# Patient Record
Sex: MALE | Race: WHITE | NOT HISPANIC OR LATINO | Employment: OTHER | URBAN - METROPOLITAN AREA
[De-identification: names, ages, dates, MRNs, and addresses within clinical notes are randomized per-mention and may not be internally consistent; named-entity substitution may affect disease eponyms.]

---

## 2024-01-10 RX ORDER — ALBUTEROL SULFATE 90 UG/1
AEROSOL, METERED RESPIRATORY (INHALATION)
Refills: 2 | OUTPATIENT
Start: 2024-01-10

## 2024-02-12 DIAGNOSIS — J45.909 ASTHMA, UNSPECIFIED ASTHMA SEVERITY, UNSPECIFIED WHETHER COMPLICATED, UNSPECIFIED WHETHER PERSISTENT (HHS-HCC): Primary | ICD-10-CM

## 2024-02-12 RX ORDER — ALBUTEROL SULFATE 90 UG/1
AEROSOL, METERED RESPIRATORY (INHALATION)
Qty: 18 G | Refills: 2 | OUTPATIENT
Start: 2024-02-12

## 2024-02-14 RX ORDER — ALBUTEROL SULFATE 90 UG/1
AEROSOL, METERED RESPIRATORY (INHALATION)
COMMUNITY
Start: 2023-12-06 | End: 2024-02-14 | Stop reason: SDUPTHER

## 2024-02-14 RX ORDER — ALBUTEROL SULFATE 90 UG/1
AEROSOL, METERED RESPIRATORY (INHALATION)
Qty: 18 G | Refills: 3 | Status: SHIPPED | OUTPATIENT
Start: 2024-02-14 | End: 2024-04-08 | Stop reason: SDUPTHER

## 2024-04-08 ENCOUNTER — OFFICE VISIT (OUTPATIENT)
Dept: PRIMARY CARE | Facility: CLINIC | Age: 64
End: 2024-04-08
Payer: COMMERCIAL

## 2024-04-08 VITALS
SYSTOLIC BLOOD PRESSURE: 124 MMHG | OXYGEN SATURATION: 97 % | BODY MASS INDEX: 24.62 KG/M2 | HEIGHT: 70 IN | HEART RATE: 76 BPM | DIASTOLIC BLOOD PRESSURE: 84 MMHG | TEMPERATURE: 98.4 F | WEIGHT: 172 LBS

## 2024-04-08 DIAGNOSIS — Z72.0 TOBACCO USE: ICD-10-CM

## 2024-04-08 DIAGNOSIS — Z23 NEED FOR DIPHTHERIA-TETANUS-PERTUSSIS (TDAP) VACCINE: ICD-10-CM

## 2024-04-08 DIAGNOSIS — Z00.00 ANNUAL PHYSICAL EXAM: Primary | ICD-10-CM

## 2024-04-08 DIAGNOSIS — Z12.5 PROSTATE CANCER SCREENING: ICD-10-CM

## 2024-04-08 DIAGNOSIS — J45.909 ASTHMA, UNSPECIFIED ASTHMA SEVERITY, UNSPECIFIED WHETHER COMPLICATED, UNSPECIFIED WHETHER PERSISTENT (HHS-HCC): ICD-10-CM

## 2024-04-08 DIAGNOSIS — Z12.11 COLON CANCER SCREENING: ICD-10-CM

## 2024-04-08 DIAGNOSIS — H61.23 BILATERAL IMPACTED CERUMEN: ICD-10-CM

## 2024-04-08 PROCEDURE — 99396 PREV VISIT EST AGE 40-64: CPT | Performed by: FAMILY MEDICINE

## 2024-04-08 PROCEDURE — 90715 TDAP VACCINE 7 YRS/> IM: CPT

## 2024-04-08 PROCEDURE — 90471 IMMUNIZATION ADMIN: CPT

## 2024-04-08 RX ORDER — MELOXICAM 7.5 MG/1
7.5 TABLET ORAL DAILY
COMMUNITY
Start: 2024-04-07

## 2024-04-08 RX ORDER — TRAMADOL HYDROCHLORIDE 50 MG/1
50 TABLET ORAL EVERY 6 HOURS PRN
COMMUNITY
Start: 2024-02-12

## 2024-04-08 RX ORDER — ALBUTEROL SULFATE 90 UG/1
AEROSOL, METERED RESPIRATORY (INHALATION)
Qty: 18 G | Refills: 3 | Status: SHIPPED | OUTPATIENT
Start: 2024-04-08

## 2024-04-08 ASSESSMENT — ENCOUNTER SYMPTOMS
VOMITING: 0
WEAKNESS: 0
HEMATURIA: 0
NAUSEA: 0
CONSTIPATION: 0
FREQUENCY: 0
DIZZINESS: 0
JOINT SWELLING: 1
ARTHRALGIAS: 1
BLOOD IN STOOL: 0
DIARRHEA: 0
SHORTNESS OF BREATH: 1
HEADACHES: 0
LIGHT-HEADEDNESS: 0
DIFFICULTY URINATING: 0

## 2024-04-08 ASSESSMENT — PATIENT HEALTH QUESTIONNAIRE - PHQ9
1. LITTLE INTEREST OR PLEASURE IN DOING THINGS: NOT AT ALL
2. FEELING DOWN, DEPRESSED OR HOPELESS: NOT AT ALL
SUM OF ALL RESPONSES TO PHQ9 QUESTIONS 1 AND 2: 0

## 2024-04-08 NOTE — PROGRESS NOTES
Chief Complaint   Flavio Bahena is a 63 y.o. male who presents for annual physical exam.     Pt states that he has no new complaints or concerns today. Continues to follow with specialist for his hands. Has not had any recent surgeries, hospitalizations, or ER visits. ROS reviewed as below.    ROS:  Review of Systems   HENT:  Positive for hearing loss. Negative for nosebleeds.    Respiratory:  Positive for shortness of breath.    Cardiovascular:  Negative for chest pain and leg swelling.   Gastrointestinal:  Negative for blood in stool, constipation, diarrhea, nausea and vomiting.   Genitourinary:  Negative for difficulty urinating, frequency and hematuria.   Musculoskeletal:  Positive for arthralgias and joint swelling.   Skin:  Negative for rash.   Neurological:  Negative for dizziness, syncope, weakness, light-headedness and headaches.         PMH:  No past medical history on file.      PSH/Trauma:  No past surgical history on file.      Meds:    Current Outpatient Medications:     meloxicam (Mobic) 7.5 mg tablet, Take 1 tablet (7.5 mg) by mouth once daily., Disp: , Rfl:     traMADol (Ultram) 50 mg tablet, Take 1 tablet (50 mg) by mouth every 6 hours if needed., Disp: , Rfl:     albuterol 90 mcg/actuation inhaler, INHALE 1 TO 2 PUFFS EVERY 4 TO 6 HOURS., Disp: 18 g, Rfl: 3    carbamide peroxide (Debrox) 6.5 % otic solution, Administer 5 drops into each ear 2 times a day for 4 days., Disp: 15 mL, Rfl: 0    Allergies:  No Known Allergies    SH:  Social Determinants of Health     Tobacco Use: Medium Risk (4/8/2024)    Patient History     Smoking Tobacco Use: Former     Smokeless Tobacco Use: Never     Passive Exposure: Not on file   Alcohol Use: Not on file   Financial Resource Strain: Not on file   Food Insecurity: Not on file   Transportation Needs: Not on file   Physical Activity: Not on file   Stress: Not on file   Social Connections: Not on file   Intimate Partner Violence: Not on file   Depression: Not at  "risk (4/8/2024)    PHQ-2     PHQ-2 Score: 0   Housing Stability: Not on file   Utilities: Not on file   Digital Equity: Not on file   3 kids and     FH:  No family history on file.     Preventatives:  Colonoscopy- ordered  AAA screening-  ordered  Lung CA screening- ordered  Health Maintenance   Topic Date Due    Yearly Adult Physical  Never done    HIV Screening  Never done    Colorectal Cancer Screening  Never done    MMR Vaccines (1 of 1 - Standard series) Never done    Hepatitis C Screening  Never done    Zoster Vaccines (1 of 2) Never done    COVID-19 Vaccine (4 - 2023-24 season) 09/01/2023    Influenza Vaccine (Season Ended) 09/01/2024    Lipid Panel  02/28/2025    DTaP/Tdap/Td Vaccines (2 - Td or Tdap) 04/08/2034    HIB Vaccines  Aged Out    Hepatitis B Vaccines  Aged Out    IPV Vaccines  Aged Out    Hepatitis A Vaccines  Aged Out    Meningococcal Vaccine  Aged Out    Rotavirus Vaccines  Aged Out    HPV Vaccines  Aged Out    Pneumococcal Vaccine: Pediatrics (0 to 5 Years) and At-Risk Patients (6 to 64 Years)  Aged Out     ASCVD risk score The ASCVD Risk score (Lucinda CASILLAS, et al., 2019) failed to calculate for the following reasons:    Cannot find a previous HDL lab    Cannot find a previous total cholesterol lab    Unable to determine if patient is Non-             PE:  Visit Vitals  /84   Pulse 76   Temp 36.9 °C (98.4 °F)   Ht 1.778 m (5' 10\")   Wt 78 kg (172 lb)   SpO2 97%   BMI 24.68 kg/m²   Smoking Status Former   BSA 1.96 m²     Physical Exam  Constitutional:       Appearance: Normal appearance. He is normal weight.   HENT:      Head: Normocephalic and atraumatic.      Right Ear: Decreased hearing noted. There is impacted cerumen.      Left Ear: Decreased hearing noted. There is impacted cerumen.      Nose: Nose normal.      Mouth/Throat:      Mouth: Mucous membranes are moist.      Pharynx: Oropharynx is clear.   Eyes:      Extraocular Movements: Extraocular movements " intact.      Conjunctiva/sclera: Conjunctivae normal.      Pupils: Pupils are equal, round, and reactive to light.   Cardiovascular:      Rate and Rhythm: Normal rate and regular rhythm.      Heart sounds: Normal heart sounds.   Pulmonary:      Effort: Pulmonary effort is normal.      Breath sounds: Normal breath sounds.   Abdominal:      General: Abdomen is flat.      Palpations: Abdomen is soft.   Musculoskeletal:         General: Normal range of motion.   Skin:     General: Skin is warm and dry.      Capillary Refill: Capillary refill takes less than 2 seconds.   Neurological:      General: No focal deficit present.      Mental Status: He is alert and oriented to person, place, and time. Mental status is at baseline.   Psychiatric:         Mood and Affect: Mood normal.         Behavior: Behavior normal.         Thought Content: Thought content normal.         Judgment: Judgment normal.         Assessment/Plan  Flavio was seen today for annual exam.  Diagnoses and all orders for this visit:  Annual physical exam (Primary)  -     CBC; Future  -     Comprehensive Metabolic Panel; Future  -     Lipid Panel; Future  -     TSH with reflex to Free T4 if abnormal; Future  Asthma, unspecified asthma severity, unspecified whether complicated, unspecified whether persistent  -     albuterol 90 mcg/actuation inhaler; INHALE 1 TO 2 PUFFS EVERY 4 TO 6 HOURS.  Colon cancer screening  -     Cancel: Cologuard® colon cancer screening; Future  -     Cancel: Cologuard® colon cancer screening  -     Colonoscopy Screening; Average Risk Patient; Future  Tobacco use  -     CT lung screening low dose; Future  -     Vascular US Abdominal Aorta Aneurysm AAA Screening; Future  Prostate cancer screening  -     Prostate Specific Antigen, Screen; Future  Need for diphtheria-tetanus-pertussis (Tdap) vaccine  -     Tdap vaccine, age 7 years and older (ADACEL)  Bilateral impacted cerumen  -     carbamide peroxide (Debrox) 6.5 % otic solution;  Administer 5 drops into each ear 2 times a day for 4 days.       Pt is a pleasant 62 yo M who presenting to the office for an annual physical. Patient was noted to have bilateral cerumen impaction. Patient was prescribed some Debrox to use for the next 4 days then comeback in 2 weeks for his cerumen impaction. Patient was also given a Tdap vaccine at this visit. Due to patients history of tobacco use a CT low dose lung was ordered along with a AAA screening. Medication refills was sent to the patient preferred pharmacy. Yearly blood work was also ordered for the patient to complete.    Follow up in 2 weeks for ear cleaning and follow up with results.      Patient was staffed with Dr. Paul Pat DO, PGY-2  [facility]

## 2024-04-09 NOTE — PROGRESS NOTES
I reviewed and examined the patient. I was present for the key exam elements, and I fully participated in the patient's care. I discussed the management of the care with the resident. I have personally reviewed the pertinent labs and imaging, as well as recent notes, with the patient. I have reviewed the note above and agree with the resident's medical decision making as documented in the resident's note, in addition to the following comments / findings:     Agree with the rest of the plan outlined below by resident physician. No red flags.      The patient understands and agrees to the assessment and plan of care. Patient has also agreed to follow up and comply with the treatment and evaluation as recommended today. Patient was instructed to call the office at [phone] should questions arise regarding their treatment or care.     Vj Miller DO, FAOASM  Family Medicine   [address], [address]  [place] [address]     Vj Miller DO

## 2024-04-10 ENCOUNTER — LAB (OUTPATIENT)
Dept: LAB | Facility: LAB | Age: 64
End: 2024-04-10
Payer: COMMERCIAL

## 2024-04-10 DIAGNOSIS — Z12.5 PROSTATE CANCER SCREENING: ICD-10-CM

## 2024-04-10 DIAGNOSIS — Z00.00 ANNUAL PHYSICAL EXAM: ICD-10-CM

## 2024-04-10 LAB
ALBUMIN SERPL BCP-MCNC: 4.3 G/DL (ref 3.4–5)
ALP SERPL-CCNC: 64 U/L (ref 33–136)
ALT SERPL W P-5'-P-CCNC: 26 U/L (ref 10–52)
ANION GAP SERPL CALC-SCNC: 14 MMOL/L (ref 10–20)
AST SERPL W P-5'-P-CCNC: 20 U/L (ref 9–39)
BILIRUB SERPL-MCNC: 0.5 MG/DL (ref 0–1.2)
BUN SERPL-MCNC: 16 MG/DL (ref 6–23)
CALCIUM SERPL-MCNC: 9 MG/DL (ref 8.6–10.3)
CHLORIDE SERPL-SCNC: 102 MMOL/L (ref 98–107)
CHOLEST SERPL-MCNC: 170 MG/DL (ref 0–199)
CHOLESTEROL/HDL RATIO: 3.5
CO2 SERPL-SCNC: 27 MMOL/L (ref 21–32)
CREAT SERPL-MCNC: 1.03 MG/DL (ref 0.5–1.3)
EGFRCR SERPLBLD CKD-EPI 2021: 83 ML/MIN/1.73M*2
ERYTHROCYTE [DISTWIDTH] IN BLOOD BY AUTOMATED COUNT: 13.5 % (ref 11.5–14.5)
GLUCOSE SERPL-MCNC: 94 MG/DL (ref 74–99)
HCT VFR BLD AUTO: 52.2 % (ref 41–52)
HDLC SERPL-MCNC: 48.9 MG/DL
HGB BLD-MCNC: 17.6 G/DL (ref 13.5–17.5)
LDLC SERPL CALC-MCNC: 106 MG/DL
MCH RBC QN AUTO: 31.3 PG (ref 26–34)
MCHC RBC AUTO-ENTMCNC: 33.7 G/DL (ref 32–36)
MCV RBC AUTO: 93 FL (ref 80–100)
NON HDL CHOLESTEROL: 121 MG/DL (ref 0–149)
NRBC BLD-RTO: 0 /100 WBCS (ref 0–0)
PLATELET # BLD AUTO: 323 X10*3/UL (ref 150–450)
POTASSIUM SERPL-SCNC: 4.7 MMOL/L (ref 3.5–5.3)
PROT SERPL-MCNC: 6.4 G/DL (ref 6.4–8.2)
PSA SERPL-MCNC: 3.74 NG/ML
RBC # BLD AUTO: 5.62 X10*6/UL (ref 4.5–5.9)
SODIUM SERPL-SCNC: 138 MMOL/L (ref 136–145)
TRIGL SERPL-MCNC: 76 MG/DL (ref 0–149)
TSH SERPL-ACNC: 1.71 MIU/L (ref 0.44–3.98)
VLDL: 15 MG/DL (ref 0–40)
WBC # BLD AUTO: 7.8 X10*3/UL (ref 4.4–11.3)

## 2024-04-10 PROCEDURE — 85027 COMPLETE CBC AUTOMATED: CPT

## 2024-04-10 PROCEDURE — 36415 COLL VENOUS BLD VENIPUNCTURE: CPT

## 2024-04-10 PROCEDURE — 80061 LIPID PANEL: CPT

## 2024-04-10 PROCEDURE — 84443 ASSAY THYROID STIM HORMONE: CPT

## 2024-04-10 PROCEDURE — 80053 COMPREHEN METABOLIC PANEL: CPT

## 2024-04-10 PROCEDURE — 84153 ASSAY OF PSA TOTAL: CPT

## 2024-04-11 ENCOUNTER — APPOINTMENT (OUTPATIENT)
Dept: VASCULAR MEDICINE | Facility: HOSPITAL | Age: 64
End: 2024-04-11
Payer: COMMERCIAL

## 2024-04-30 ENCOUNTER — HOSPITAL ENCOUNTER (OUTPATIENT)
Dept: RADIOLOGY | Facility: HOSPITAL | Age: 64
Discharge: HOME | End: 2024-04-30
Payer: COMMERCIAL

## 2024-04-30 ENCOUNTER — OFFICE VISIT (OUTPATIENT)
Dept: PRIMARY CARE | Facility: CLINIC | Age: 64
End: 2024-04-30
Payer: COMMERCIAL

## 2024-04-30 VITALS
SYSTOLIC BLOOD PRESSURE: 124 MMHG | TEMPERATURE: 97.1 F | DIASTOLIC BLOOD PRESSURE: 70 MMHG | OXYGEN SATURATION: 98 % | HEIGHT: 71 IN | BODY MASS INDEX: 24.36 KG/M2 | WEIGHT: 174 LBS | HEART RATE: 78 BPM

## 2024-04-30 DIAGNOSIS — Z72.0 TOBACCO USE: ICD-10-CM

## 2024-04-30 DIAGNOSIS — H61.21 IMPACTED CERUMEN OF RIGHT EAR: Primary | ICD-10-CM

## 2024-04-30 DIAGNOSIS — E78.49 OTHER HYPERLIPIDEMIA: ICD-10-CM

## 2024-04-30 PROBLEM — J01.90 ACUTE SINUSITIS: Status: ACTIVE | Noted: 2024-04-30

## 2024-04-30 PROBLEM — G89.29 CHRONIC PAIN OF RIGHT KNEE: Status: ACTIVE | Noted: 2020-02-17

## 2024-04-30 PROBLEM — J45.909 ASTHMA (HHS-HCC): Status: ACTIVE | Noted: 2024-04-30

## 2024-04-30 PROBLEM — H61.23 BILATERAL IMPACTED CERUMEN: Status: ACTIVE | Noted: 2024-04-30

## 2024-04-30 PROBLEM — J32.9 SINUSITIS: Status: ACTIVE | Noted: 2024-04-30

## 2024-04-30 PROBLEM — M25.561 CHRONIC PAIN OF RIGHT KNEE: Status: ACTIVE | Noted: 2020-02-17

## 2024-04-30 PROCEDURE — 99214 OFFICE O/P EST MOD 30 MIN: CPT | Performed by: FAMILY MEDICINE

## 2024-04-30 PROCEDURE — 71271 CT THORAX LUNG CANCER SCR C-: CPT

## 2024-04-30 PROCEDURE — 1036F TOBACCO NON-USER: CPT | Performed by: FAMILY MEDICINE

## 2024-04-30 RX ORDER — ROSUVASTATIN CALCIUM 10 MG/1
10 TABLET, COATED ORAL DAILY
Qty: 100 TABLET | Refills: 1 | Status: SHIPPED | OUTPATIENT
Start: 2024-04-30 | End: 2024-11-16

## 2024-04-30 ASSESSMENT — PAIN SCALES - GENERAL: PAINLEVEL: 0-NO PAIN

## 2024-04-30 NOTE — PROGRESS NOTES
I reviewed and examined the patient. I was present for the key exam elements, and I fully participated in the patient's care. I discussed the management of the care with the resident. I have personally reviewed the pertinent labs and imaging, as well as recent notes, with the patient. I have reviewed the note above and agree with the resident's medical decision making as documented in the resident's note, in addition to the following comments / findings:     Agree with the rest of the plan outlined below by resident physician. No red flags.      The patient understands and agrees to the assessment and plan of care. Patient has also agreed to follow up and comply with the treatment and evaluation as recommended today. Patient was instructed to call the office at [phone] should questions arise regarding their treatment or care.     Vj Miller DO, FAOASM  Family Medicine   [address], [address]  [place] [address]     Vj Miller DO

## 2024-04-30 NOTE — PROGRESS NOTES
"Subjective   Patient ID: Flavio Bahena is a 63 y.o. male who presents for ear cleaning (Can't hear out of right ear).    HPI   Cerumen impaction  Patient is here for an ear cleaning, was instructed to use debrox and follow up. Has been using debrox for last week. Is hard of hearing at baseline. No ear pain. Otherwise feeling well today.    Review lab results  Patient would like to review his lab results as well particularly his lipid panel.     Review of Systems  Negative unless stated in above HPI     Objective   /70   Pulse 78   Temp 36.2 °C (97.1 °F)   Ht 1.791 m (5' 10.5\")   Wt 78.9 kg (174 lb)   SpO2 98%   BMI 24.61 kg/m²     Physical Exam  Constitutional:       General: He is not in acute distress.     Appearance: Normal appearance. He is normal weight.   HENT:      Head: Normocephalic and atraumatic.      Right Ear: Tympanic membrane, ear canal and external ear normal. There is no impacted cerumen.      Left Ear: Tympanic membrane, ear canal and external ear normal. There is no impacted cerumen.   Eyes:      Extraocular Movements: Extraocular movements intact.   Cardiovascular:      Rate and Rhythm: Normal rate and regular rhythm.      Heart sounds: No murmur heard.  Pulmonary:      Effort: Pulmonary effort is normal.      Breath sounds: No wheezing, rhonchi or rales.   Neurological:      General: No focal deficit present.      Mental Status: He is alert.   Psychiatric:         Mood and Affect: Mood normal.         Behavior: Behavior normal.         Assessment/Plan   Flavio is a 62 yo male who presents for cerumen impaction and to review his lab results.    Problem List Items Addressed This Visit         Codes    Impacted cerumen of right ear    -  Primary  Patient's ears were irrigated today, relieving him of cerumen impaction  Pt tolerated the procedure well   H61.21    Other hyperlipidemia      Lipid panel with , Tchol 170, and HDL 48  ASCVD risk score 12.1% with hx of " smoking  Recommend starting Crestor 10mg, sent to pharmacy  Repeat Lipid panel and CMP in 3 months   E78.49    Relevant Medications    rosuvastatin (Crestor) 10 mg tablet    Other Relevant Orders    Lipid Panel    Comprehensive metabolic panel     Patient was seen and discussed with attending physician Dr. Miller.  Leyla Hart, DO  Family Medicine, PGY-2

## 2024-05-02 ENCOUNTER — HOSPITAL ENCOUNTER (OUTPATIENT)
Dept: VASCULAR MEDICINE | Facility: HOSPITAL | Age: 64
Discharge: HOME | End: 2024-05-02
Payer: COMMERCIAL

## 2024-05-02 DIAGNOSIS — Z13.6 ENCOUNTER FOR SCREENING FOR CARDIOVASCULAR DISORDERS: ICD-10-CM

## 2024-05-02 DIAGNOSIS — Z72.0 TOBACCO USE: ICD-10-CM

## 2024-05-02 PROCEDURE — 76706 US ABDL AORTA SCREEN AAA: CPT

## 2024-05-02 PROCEDURE — 76706 US ABDL AORTA SCREEN AAA: CPT | Performed by: SURGERY

## 2024-07-02 DIAGNOSIS — I70.90 ATHEROSCLEROSIS: ICD-10-CM

## 2024-07-02 DIAGNOSIS — R91.8 LUNG NODULES: Primary | ICD-10-CM

## 2024-07-02 DIAGNOSIS — I25.10 CORONARY ARTERY DISEASE INVOLVING NATIVE HEART WITHOUT ANGINA PECTORIS, UNSPECIFIED VESSEL OR LESION TYPE: Primary | ICD-10-CM

## 2024-08-01 ENCOUNTER — APPOINTMENT (OUTPATIENT)
Dept: PRIMARY CARE | Facility: CLINIC | Age: 64
End: 2024-08-01
Payer: COMMERCIAL

## 2024-09-23 ENCOUNTER — TELEPHONE (OUTPATIENT)
Dept: PRIMARY CARE | Facility: CLINIC | Age: 64
End: 2024-09-23
Payer: COMMERCIAL

## 2024-09-23 DIAGNOSIS — J06.9 UPPER RESPIRATORY TRACT INFECTION, UNSPECIFIED TYPE: Primary | ICD-10-CM

## 2024-09-23 RX ORDER — AZITHROMYCIN 250 MG/1
TABLET, FILM COATED ORAL
Qty: 6 TABLET | Refills: 0 | Status: SHIPPED | OUTPATIENT
Start: 2024-09-23 | End: 2024-09-28

## 2024-09-23 NOTE — TELEPHONE ENCOUNTER
Patient would like to know what steps need to be taken in order for him to get a hearing implant    Patient also stated that he has a bit a chest cold and wanted to know if you would be willing to call in a Zpack

## 2024-09-29 ENCOUNTER — HOSPITAL ENCOUNTER (EMERGENCY)
Facility: HOSPITAL | Age: 64
Discharge: HOME | End: 2024-09-30
Payer: COMMERCIAL

## 2024-09-29 ENCOUNTER — APPOINTMENT (OUTPATIENT)
Dept: RADIOLOGY | Facility: HOSPITAL | Age: 64
End: 2024-09-29
Payer: COMMERCIAL

## 2024-09-29 DIAGNOSIS — U07.1 COVID: ICD-10-CM

## 2024-09-29 DIAGNOSIS — J44.1 COPD EXACERBATION (MULTI): Primary | ICD-10-CM

## 2024-09-29 LAB
ALBUMIN SERPL BCP-MCNC: 4.2 G/DL (ref 3.4–5)
ALP SERPL-CCNC: 67 U/L (ref 33–136)
ALT SERPL W P-5'-P-CCNC: 38 U/L (ref 10–52)
ANION GAP SERPL CALC-SCNC: 12 MMOL/L (ref 10–20)
AST SERPL W P-5'-P-CCNC: 25 U/L (ref 9–39)
BASOPHILS # BLD AUTO: 0.02 X10*3/UL (ref 0–0.1)
BASOPHILS NFR BLD AUTO: 0.2 %
BILIRUB SERPL-MCNC: 0.4 MG/DL (ref 0–1.2)
BUN SERPL-MCNC: 12 MG/DL (ref 6–23)
CALCIUM SERPL-MCNC: 8.6 MG/DL (ref 8.6–10.3)
CARDIAC TROPONIN I PNL SERPL HS: 6 NG/L (ref 0–20)
CHLORIDE SERPL-SCNC: 98 MMOL/L (ref 98–107)
CO2 SERPL-SCNC: 29 MMOL/L (ref 21–32)
CREAT SERPL-MCNC: 0.87 MG/DL (ref 0.5–1.3)
EGFRCR SERPLBLD CKD-EPI 2021: >90 ML/MIN/1.73M*2
EOSINOPHIL # BLD AUTO: 0.02 X10*3/UL (ref 0–0.7)
EOSINOPHIL NFR BLD AUTO: 0.2 %
ERYTHROCYTE [DISTWIDTH] IN BLOOD BY AUTOMATED COUNT: 13.1 % (ref 11.5–14.5)
FLUAV RNA RESP QL NAA+PROBE: NOT DETECTED
FLUBV RNA RESP QL NAA+PROBE: NOT DETECTED
GLUCOSE SERPL-MCNC: 102 MG/DL (ref 74–99)
HCT VFR BLD AUTO: 49.1 % (ref 41–52)
HGB BLD-MCNC: 16.6 G/DL (ref 13.5–17.5)
IMM GRANULOCYTES # BLD AUTO: 0.02 X10*3/UL (ref 0–0.7)
IMM GRANULOCYTES NFR BLD AUTO: 0.2 % (ref 0–0.9)
LACTATE SERPL-SCNC: 0.8 MMOL/L (ref 0.4–2)
LYMPHOCYTES # BLD AUTO: 1.68 X10*3/UL (ref 1.2–4.8)
LYMPHOCYTES NFR BLD AUTO: 20 %
MAGNESIUM SERPL-MCNC: 2.05 MG/DL (ref 1.6–2.4)
MCH RBC QN AUTO: 30.6 PG (ref 26–34)
MCHC RBC AUTO-ENTMCNC: 33.8 G/DL (ref 32–36)
MCV RBC AUTO: 90 FL (ref 80–100)
MONOCYTES # BLD AUTO: 1.09 X10*3/UL (ref 0.1–1)
MONOCYTES NFR BLD AUTO: 13 %
NEUTROPHILS # BLD AUTO: 5.55 X10*3/UL (ref 1.2–7.7)
NEUTROPHILS NFR BLD AUTO: 66.4 %
NRBC BLD-RTO: 0 /100 WBCS (ref 0–0)
PLATELET # BLD AUTO: 240 X10*3/UL (ref 150–450)
POTASSIUM SERPL-SCNC: 4.1 MMOL/L (ref 3.5–5.3)
PROT SERPL-MCNC: 6.8 G/DL (ref 6.4–8.2)
RBC # BLD AUTO: 5.43 X10*6/UL (ref 4.5–5.9)
SARS-COV-2 RNA RESP QL NAA+PROBE: DETECTED
SODIUM SERPL-SCNC: 135 MMOL/L (ref 136–145)
WBC # BLD AUTO: 8.4 X10*3/UL (ref 4.4–11.3)

## 2024-09-29 PROCEDURE — 85025 COMPLETE CBC W/AUTO DIFF WBC: CPT | Performed by: NURSE PRACTITIONER

## 2024-09-29 PROCEDURE — 2500000004 HC RX 250 GENERAL PHARMACY W/ HCPCS (ALT 636 FOR OP/ED): Performed by: NURSE PRACTITIONER

## 2024-09-29 PROCEDURE — 83735 ASSAY OF MAGNESIUM: CPT | Performed by: NURSE PRACTITIONER

## 2024-09-29 PROCEDURE — 71275 CT ANGIOGRAPHY CHEST: CPT

## 2024-09-29 PROCEDURE — 71275 CT ANGIOGRAPHY CHEST: CPT | Performed by: RADIOLOGY

## 2024-09-29 PROCEDURE — 87636 SARSCOV2 & INF A&B AMP PRB: CPT | Performed by: STUDENT IN AN ORGANIZED HEALTH CARE EDUCATION/TRAINING PROGRAM

## 2024-09-29 PROCEDURE — 96361 HYDRATE IV INFUSION ADD-ON: CPT

## 2024-09-29 PROCEDURE — 96375 TX/PRO/DX INJ NEW DRUG ADDON: CPT

## 2024-09-29 PROCEDURE — 96374 THER/PROPH/DIAG INJ IV PUSH: CPT

## 2024-09-29 PROCEDURE — 36415 COLL VENOUS BLD VENIPUNCTURE: CPT | Performed by: NURSE PRACTITIONER

## 2024-09-29 PROCEDURE — 99285 EMERGENCY DEPT VISIT HI MDM: CPT

## 2024-09-29 PROCEDURE — 83605 ASSAY OF LACTIC ACID: CPT | Performed by: NURSE PRACTITIONER

## 2024-09-29 PROCEDURE — 80053 COMPREHEN METABOLIC PANEL: CPT | Performed by: NURSE PRACTITIONER

## 2024-09-29 PROCEDURE — 2550000001 HC RX 255 CONTRASTS: Performed by: NURSE PRACTITIONER

## 2024-09-29 PROCEDURE — 84484 ASSAY OF TROPONIN QUANT: CPT | Performed by: NURSE PRACTITIONER

## 2024-09-29 RX ORDER — FAMOTIDINE 20 MG/1
20 TABLET, FILM COATED ORAL NIGHTLY
Qty: 5 TABLET | Refills: 1 | Status: SHIPPED | OUTPATIENT
Start: 2024-09-29 | End: 2024-10-04

## 2024-09-29 RX ORDER — KETOROLAC TROMETHAMINE 30 MG/ML
30 INJECTION, SOLUTION INTRAMUSCULAR; INTRAVENOUS ONCE
Status: COMPLETED | OUTPATIENT
Start: 2024-09-29 | End: 2024-09-29

## 2024-09-29 RX ORDER — DOXYCYCLINE 100 MG/1
100 CAPSULE ORAL 2 TIMES DAILY
Qty: 14 CAPSULE | Refills: 0 | Status: SHIPPED | OUTPATIENT
Start: 2024-09-29 | End: 2024-10-06

## 2024-09-29 RX ORDER — DOXYCYCLINE HYCLATE 100 MG
100 TABLET ORAL ONCE
Status: COMPLETED | OUTPATIENT
Start: 2024-09-29 | End: 2024-09-30

## 2024-09-29 RX ORDER — PREDNISONE 10 MG/1
40 TABLET ORAL DAILY
Qty: 16 TABLET | Refills: 0 | Status: SHIPPED | OUTPATIENT
Start: 2024-09-29 | End: 2024-10-03

## 2024-09-29 ASSESSMENT — COLUMBIA-SUICIDE SEVERITY RATING SCALE - C-SSRS
2. HAVE YOU ACTUALLY HAD ANY THOUGHTS OF KILLING YOURSELF?: NO
1. IN THE PAST MONTH, HAVE YOU WISHED YOU WERE DEAD OR WISHED YOU COULD GO TO SLEEP AND NOT WAKE UP?: NO
6. HAVE YOU EVER DONE ANYTHING, STARTED TO DO ANYTHING, OR PREPARED TO DO ANYTHING TO END YOUR LIFE?: NO

## 2024-09-29 ASSESSMENT — LIFESTYLE VARIABLES
EVER FELT BAD OR GUILTY ABOUT YOUR DRINKING: NO
TOTAL SCORE: 0
EVER HAD A DRINK FIRST THING IN THE MORNING TO STEADY YOUR NERVES TO GET RID OF A HANGOVER: NO
HAVE PEOPLE ANNOYED YOU BY CRITICIZING YOUR DRINKING: NO
HAVE YOU EVER FELT YOU SHOULD CUT DOWN ON YOUR DRINKING: NO

## 2024-09-29 ASSESSMENT — PAIN - FUNCTIONAL ASSESSMENT: PAIN_FUNCTIONAL_ASSESSMENT: 0-10

## 2024-09-29 ASSESSMENT — PAIN SCALES - GENERAL: PAINLEVEL_OUTOF10: 0 - NO PAIN

## 2024-09-30 ENCOUNTER — APPOINTMENT (OUTPATIENT)
Dept: CARDIOLOGY | Facility: HOSPITAL | Age: 64
End: 2024-09-30
Payer: COMMERCIAL

## 2024-09-30 VITALS
HEIGHT: 70 IN | TEMPERATURE: 98.1 F | OXYGEN SATURATION: 93 % | RESPIRATION RATE: 16 BRPM | HEART RATE: 89 BPM | SYSTOLIC BLOOD PRESSURE: 124 MMHG | WEIGHT: 172 LBS | BODY MASS INDEX: 24.62 KG/M2 | DIASTOLIC BLOOD PRESSURE: 87 MMHG

## 2024-09-30 PROCEDURE — 2500000001 HC RX 250 WO HCPCS SELF ADMINISTERED DRUGS (ALT 637 FOR MEDICARE OP): Performed by: NURSE PRACTITIONER

## 2024-09-30 PROCEDURE — 93005 ELECTROCARDIOGRAM TRACING: CPT

## 2024-09-30 NOTE — ED PROVIDER NOTES
[facility]  ED  Encounter Note  Admit Date/RoomTime: 2024  9:17 PM  [facility]  NAME: Flavio Bahena  : 1960  MRN: [id number]     Chief Complaint:  Shortness of Breath (Not feeling well for a week,   Tested positive for Covid Friday and Saturday.  Sent by PCP for low SPO2.)    HISTORY OF PRESENT ILLNESS        Flavio Bahena is a 63 y.o. male who presents to the ED for evaluation of sob, coughing. Patient stated that he has been sick for one week. He tested last weekend negative and was given z pack by PCP which did not help. He took covid test on Friday and Saturday and is now covid+. He went to the  as he was not feeling well but they referred him to the ED as his 02 levels were on the lower side. No formal diagnosis of copd but does smoke 2 packs of cig per day. Appetite fair. Drinking okay but not eating okay. Very [place].  No chest pain back pain. Hx HLD and takes his medication.     ROS   Pertinent positives and negatives are stated within HPI, all other systems reviewed and are negative.    Past Medical History:  has no past medical history on file.    Surgical History:  has no past surgical history on file.    Social History:  reports that he has quit smoking. His smoking use included cigarettes. He has never used smokeless tobacco. He reports current alcohol use of about 2.0 standard drinks of alcohol per week. He reports that he does not use drugs.    Family History: family history is not on file.     Allergies: Patient has no known allergies.    PHYSICAL EXAM   Oxygen Saturation Interpretation: Normal.      Physical Exam  Constitutional/General: Alert and oriented x3, well appearing, non toxic  HEENT:  NC/NT. PERRLA.  Airway patent.  Neck: Supple, full ROM. No midline vertebral tenderness or crepitus.   Respiratory: Lung sounds clear to auscultation bilaterally. ++ bilateral wheezes, no rhonchi or stridor. Not in respiratory distress.  CV:  Regular  rate. Regular rhythm. No murmurs or rubs. 2+ distal pulses.  GI:  Abdomen soft, non-tender, non-distended. +BS. No rebound, guarding, or rigidity. No pulsatile masses.  Musculoskeletal: Moves all extremities x 4. Warm and well perfused. Capillary refill <3 seconds  Integument: Skin warm and dry. No rashes.   Neurologic: Alert and oriented with no focal deficits, symmetric strength 5/5 in the upper and lower extremities bilaterally.  Psychiatric: Normal affect.    Lab / Imaging Results   (All laboratory and radiology results have been personally reviewed by myself)  Labs:  Results for orders placed or performed during the hospital encounter of 09/29/24   Sars-CoV-2 PCR   Result Value Ref Range    Coronavirus 2019, PCR Detected (A) Not Detected   Influenza A, and B PCR   Result Value Ref Range    Flu A Result Not Detected Not Detected    Flu B Result Not Detected Not Detected   CBC and Auto Differential   Result Value Ref Range    WBC 8.4 4.4 - 11.3 x10*3/uL    nRBC 0.0 0.0 - 0.0 /100 WBCs    RBC 5.43 4.50 - 5.90 x10*6/uL    Hemoglobin 16.6 13.5 - 17.5 g/dL    Hematocrit 49.1 41.0 - 52.0 %    MCV 90 80 - 100 fL    MCH 30.6 26.0 - 34.0 pg    MCHC 33.8 32.0 - 36.0 g/dL    RDW 13.1 11.5 - 14.5 %    Platelets 240 150 - 450 x10*3/uL    Neutrophils % 66.4 40.0 - 80.0 %    Immature Granulocytes %, Automated 0.2 0.0 - 0.9 %    Lymphocytes % 20.0 13.0 - 44.0 %    Monocytes % 13.0 2.0 - 10.0 %    Eosinophils % 0.2 0.0 - 6.0 %    Basophils % 0.2 0.0 - 2.0 %    Neutrophils Absolute 5.55 1.20 - 7.70 x10*3/uL    Immature Granulocytes Absolute, Automated 0.02 0.00 - 0.70 x10*3/uL    Lymphocytes Absolute 1.68 1.20 - 4.80 x10*3/uL    Monocytes Absolute 1.09 (H) 0.10 - 1.00 x10*3/uL    Eosinophils Absolute 0.02 0.00 - 0.70 x10*3/uL    Basophils Absolute 0.02 0.00 - 0.10 x10*3/uL   Magnesium   Result Value Ref Range    Magnesium 2.05 1.60 - 2.40 mg/dL   Comprehensive metabolic panel   Result Value Ref Range    Glucose 102 (H) 74 - 99  mg/dL    Sodium 135 (L) 136 - 145 mmol/L    Potassium 4.1 3.5 - 5.3 mmol/L    Chloride 98 98 - 107 mmol/L    Bicarbonate 29 21 - 32 mmol/L    Anion Gap 12 10 - 20 mmol/L    Urea Nitrogen 12 6 - 23 mg/dL    Creatinine 0.87 0.50 - 1.30 mg/dL    eGFR >90 >60 mL/min/1.73m*2    Calcium 8.6 8.6 - 10.3 mg/dL    Albumin 4.2 3.4 - 5.0 g/dL    Alkaline Phosphatase 67 33 - 136 U/L    Total Protein 6.8 6.4 - 8.2 g/dL    AST 25 9 - 39 U/L    Bilirubin, Total 0.4 0.0 - 1.2 mg/dL    ALT 38 10 - 52 U/L   Lactate   Result Value Ref Range    Lactate 0.8 0.4 - 2.0 mmol/L   Troponin I, High Sensitivity   Result Value Ref Range    Troponin I, High Sensitivity 6 0 - 20 ng/L     Imaging:  All Radiology results interpreted by Radiologist unless otherwise noted.  CT angio chest for pulmonary embolism   Final Result   1. No acute pulmonary embolism to the segmental arterial level.   2. Respiratory motion artifact limits evaluation of the lung   parenchyma. Subtle peripheral ground-glass nodules noted in the lung   bases concerning for atypical infectious/inflammatory process.   3. Mild emphysematous changes noted. Redemonstration of pleural-based   nodules in the right lung which are stable. Correlate with prior   workup and attention on continued follow-up low-dose chest CT is   advised.   4. Additional findings as described above.             MACRO:   None        Signed by: Rayna Jones 9/29/2024 11:18 PM   Dictation workstation:   [id number]          ED Course / Medical Decision Making     Medications   doxycycline (Vibra-Tabs) tablet 100 mg (has no administration in time range)   sodium chloride 0.9 % bolus 1,000 mL (0 mL intravenous Stopped 9/29/24 2336)   ketorolac (Toradol) injection 30 mg (30 mg intravenous Given 9/29/24 2214)   methylPREDNISolone sod succinate (SOLU-Medrol) injection 125 mg (125 mg intravenous Given 9/29/24 2214)   iohexol (OMNIPaque) 350 mg iodine/mL solution 75 mL (89 mL intravenous Given 9/29/24 2235)     ED  Course as of 09/30/24 0018   Sun Sep 29, 2024   2217 EKG rate 86 bpm pr interval 164 ms qrs duration 152 ms qt qtc 394/471 ms prt axes 78 128 64 nsr right bbb, personally reviewed by me [PK]      ED Course User Index  [PK] Narcisa Jay, AVA-CNP         Diagnoses as of 09/30/24 0018   COPD exacerbation (Multi)   COVID     Re-examination:  Patient’s condition stable.    MDM:       Flavio Bahena is a 63 y.o. male who presents to the ED for evaluation of sob, coughing. Patient stated that he has been sick for one week. He tested last weekend negative and was given z pack by PCP which did not help. He took covid test on Friday and Saturday and is now covid+. He went to the  as he was not feeling well but they referred him to the ED as his 02 levels were on the lower side. No formal diagnosis of copd but does smoke 2 packs of cig per day. Appetite fair. Drinking okay but not eating okay. Very [place].  No chest pain back pain. Hx HLD and takes his medication.     ED course stable  Cta pe study neg for pe  Walking pulse ox performed via nursing  Labs stable cbc cmp tnl lactate  EKG showed no acute changes  Received iv toradol fluids and solumedrol  Will send home on a brief steroid taper and doxy for the copd exacerbation.  He will need to see his PCP in followup.  Return if any worsening s/s  Ddx:  covid copd exacerbation     Plan of Care/Counseling:  I reviewed today's visit with the patient and son  in addition to providing specific details for the plan of care and counseling regarding the diagnosis and prognosis.  Questions are answered at this time and are agreeable with the plan.    ASSESSMENT     1. COPD exacerbation (Multi)    2. COVID      PLAN   Home Advised to return for signs of head injury, weakness, numbness or tingling to extremities, incontinence and Advised to return for worsening or additional problems such as abdominal or chest pain  Diagnostic tests were reviewed and questions answered. Diagnosis,  care plan and treatment options were discussed. The patient understand instructions and will follow up as directed.  Condition stable  The patient and son was given verbal follow-up instructions  Patient condition is stable    New Medications     New Medications Ordered This Visit   Medications    sodium chloride 0.9 % bolus 1,000 mL    ketorolac (Toradol) injection 30 mg    methylPREDNISolone sod succinate (SOLU-Medrol) injection 125 mg    iohexol (OMNIPaque) 350 mg iodine/mL solution 75 mL    doxycycline (Vibramycin) 100 mg capsule     Sig: Take 1 capsule (100 mg) by mouth 2 times a day for 7 days. Take with at least 8 ounces (large glass) of water, do not lie down for 30 minutes after     Dispense:  14 capsule     Refill:  0    doxycycline (Vibra-Tabs) tablet 100 mg     Order Specific Question:   Suspected Indication (Select all that apply)     Answer:   Pneumonia     Order Specific Question:   Type of Therapy     Answer:   Empiric    predniSONE (Deltasone) 10 mg tablet     Sig: Take 4 tablets (40 mg) by mouth once daily for 4 days.     Dispense:  16 tablet     Refill:  0    famotidine (Pepcid) 20 mg tablet     Sig: Take 1 tablet (20 mg) by mouth once daily at bedtime for 5 days.     Dispense:  5 tablet     Refill:  1     Electronically signed by MICHEL Lucia     **This report was transcribed using voice recognition software. Every effort was made to ensure accuracy; however, inadvertent computerized transcription errors may be present.  END OF ED PROVIDER NOTE     MICHEL Lucia  09/30/24 0018

## 2024-09-30 NOTE — DISCHARGE INSTRUCTIONS
Make sure to finish steroids and take pepsid while on the steroids  Keep hydrated drinking plenty of fluids  Rest for the next few days  If feeling congested nasally use saline nasal spray  Finish the antibiotics    Use your inhaler BUT DO NOT OVERUSE as it will cause bronchospasms    See doctor this week. Call for an appt even if virtual appt..    Return if you feel worse or not eating or drinking

## 2024-10-01 ENCOUNTER — TELEPHONE (OUTPATIENT)
Dept: PRIMARY CARE | Facility: CLINIC | Age: 64
End: 2024-10-01
Payer: COMMERCIAL

## 2024-10-01 NOTE — TELEPHONE ENCOUNTER
Patient went to the ER diagnosed with Covid.  They told him to schedule follow up at the end of week.  Patient is starting to feel better so is it necessary?

## 2024-10-15 DIAGNOSIS — E78.49 OTHER HYPERLIPIDEMIA: ICD-10-CM

## 2024-10-15 RX ORDER — ROSUVASTATIN CALCIUM 10 MG/1
10 TABLET, COATED ORAL DAILY
Qty: 30 TABLET | Refills: 0 | Status: SHIPPED | OUTPATIENT
Start: 2024-10-15

## 2024-10-21 ENCOUNTER — APPOINTMENT (OUTPATIENT)
Dept: PRIMARY CARE | Facility: CLINIC | Age: 64
End: 2024-10-21
Payer: COMMERCIAL

## 2024-10-21 VITALS
TEMPERATURE: 97.7 F | HEIGHT: 70 IN | DIASTOLIC BLOOD PRESSURE: 70 MMHG | OXYGEN SATURATION: 90 % | WEIGHT: 173.6 LBS | BODY MASS INDEX: 24.85 KG/M2 | HEART RATE: 77 BPM | SYSTOLIC BLOOD PRESSURE: 120 MMHG

## 2024-10-21 DIAGNOSIS — R05.3 CHRONIC COUGH: ICD-10-CM

## 2024-10-21 DIAGNOSIS — N52.9 ERECTILE DYSFUNCTION, UNSPECIFIED ERECTILE DYSFUNCTION TYPE: Primary | ICD-10-CM

## 2024-10-21 DIAGNOSIS — R09.89 DECREASED BREATH SOUNDS OF BOTH LUNGS: ICD-10-CM

## 2024-10-21 DIAGNOSIS — R06.09 DYSPNEA ON EXERTION: Primary | ICD-10-CM

## 2024-10-21 PROCEDURE — 3008F BODY MASS INDEX DOCD: CPT | Performed by: FAMILY MEDICINE

## 2024-10-21 PROCEDURE — 99214 OFFICE O/P EST MOD 30 MIN: CPT | Performed by: FAMILY MEDICINE

## 2024-10-21 RX ORDER — FLUTICASONE FUROATE, UMECLIDINIUM BROMIDE AND VILANTEROL TRIFENATATE 100; 62.5; 25 UG/1; UG/1; UG/1
1 POWDER RESPIRATORY (INHALATION) DAILY
Qty: 90 EACH | Refills: 3 | Status: SHIPPED | OUTPATIENT
Start: 2024-10-21 | End: 2025-10-21

## 2024-10-21 RX ORDER — DICLOFENAC SODIUM 25 MG/1
25 TABLET, DELAYED RELEASE ORAL AS NEEDED
COMMUNITY

## 2024-10-21 RX ORDER — ASCORBIC ACID 250 MG
250 TABLET,CHEWABLE ORAL DAILY
COMMUNITY

## 2024-10-21 ASSESSMENT — PATIENT HEALTH QUESTIONNAIRE - PHQ9
SUM OF ALL RESPONSES TO PHQ9 QUESTIONS 1 AND 2: 0
2. FEELING DOWN, DEPRESSED OR HOPELESS: NOT AT ALL
1. LITTLE INTEREST OR PLEASURE IN DOING THINGS: NOT AT ALL

## 2024-10-21 ASSESSMENT — LIFESTYLE VARIABLES
HOW OFTEN DO YOU HAVE A DRINK CONTAINING ALCOHOL: MONTHLY OR LESS
AUDIT-C TOTAL SCORE: 4
HOW OFTEN DO YOU HAVE SIX OR MORE DRINKS ON ONE OCCASION: WEEKLY
SKIP TO QUESTIONS 9-10: 0
HOW MANY STANDARD DRINKS CONTAINING ALCOHOL DO YOU HAVE ON A TYPICAL DAY: 1 OR 2

## 2024-10-21 ASSESSMENT — PAIN SCALES - GENERAL: PAINLEVEL_OUTOF10: 0-NO PAIN

## 2024-10-21 ASSESSMENT — COLUMBIA-SUICIDE SEVERITY RATING SCALE - C-SSRS
2. HAVE YOU ACTUALLY HAD ANY THOUGHTS OF KILLING YOURSELF?: NO
6. HAVE YOU EVER DONE ANYTHING, STARTED TO DO ANYTHING, OR PREPARED TO DO ANYTHING TO END YOUR LIFE?: NO
1. IN THE PAST MONTH, HAVE YOU WISHED YOU WERE DEAD OR WISHED YOU COULD GO TO SLEEP AND NOT WAKE UP?: NO

## 2024-10-21 ASSESSMENT — ENCOUNTER SYMPTOMS
OCCASIONAL FEELINGS OF UNSTEADINESS: 0
LOSS OF SENSATION IN FEET: 0
DEPRESSION: 0

## 2024-10-21 NOTE — PROGRESS NOTES
"Subjective   Patient ID: Flavio Bahena is a 63 y.o. male who presents for Cough (Pt. Says possible copd).    HPI   Patient describes a hard time breathing for the past couple years with increased mucus in morning. Walking to mailbox and back is laborious. Getting worse over the past few years, made worse after recent Covid infection, made much better after steroids.  Used symbicort in the past and that worked very well.  Has as needed albuterol at home which he uses 3 times a day.  Still smoking 4-5 cigarretes per day now, down from 2 packs per day previously.  -discussed chantix, worked before but had the lucid dreams  -used patches in past but stopped and still has some at home    Review of Systems  All pertinent positive symptoms are included in the history of present illness.  All other systems have been reviewed and are negative and noncontributory to this patient's current ailments.    Objective   /70   Pulse 77   Temp 36.5 °C (97.7 °F)   Ht 1.778 m (5' 10\")   Wt 78.7 kg (173 lb 9.6 oz)   SpO2 90%   BMI 24.91 kg/m²     Physical Exam    CONSTITUTIONAL - well nourished, well developed, looks like stated age, in no acute distress, not ill-appearing, and not tired appearing  SKIN - normal skin color and pigmentation, normal skin turgor without rash, lesions, or nodules visualized  HEAD - no trauma, normocephalic  EYES - extraocular muscles are intact, and normal external exam  ENT - TM's intact, no injection, no signs of infection, uvula midline, normal tongue movement and throat normal, no exudate  NECK - supple without rigidity, no neck mass was observed, no thyromegaly or thyroid nodules  CHEST - no wheezing, no crackles and no rales, decreased effort. Diminished breath sounds throughout  CARDIAC - regular rate and regular rhythm, no skipped beats, no murmur  ABDOMEN - no organomegaly, soft, nontender, nondistended, normal bowel sounds, no guarding/rebound/rigidity  EXTREMITIES - no edema, no " deformities  NEUROLOGICAL - normal gait, normal balance, normal motor, no ataxia; alert, oriented and no focal signs  PSYCHIATRIC - alert, pleasant and cordial, age-appropriate  IMMUNOLOGIC - no cervical lymphadenopathy     Assessment/Plan   Diagnoses and all orders for this visit:  Dyspnea on exertion  -     fluticasone-umeclidin-vilanter (Trelegy Ellipta) 100-62.5-25 mcg blister with device; Inhale 1 puff once daily.  -     Complete Pulmonary Function Test Pre/Post Bronchodialator (Spirometry Pre/Post/DLCO/Lung Volumes); Future  Decreased breath sounds of both lungs  Chronic cough      We ordered PFT testing and provided the number to schedule.  Please get that done so we can determine your lung health.  Trelegy sample provided today.  We discussed how to properly use the device.  We also sent in a prescription for the future.  Regarding your smoking cessation, please restart the patches and see how that works for you.  Continuing to decrease your smoking will improve your lung health going forward.    Please follow-up in 3 months to further discuss your breathing problems and see how you are doing on the new medication.    Patient case discussed with Dr. Miller.    Dakotah Jarrell DO, PGY1   Family Medicine

## 2024-10-21 NOTE — TELEPHONE ENCOUNTER
Patient asked if you could give him a script for [facility]  Pharmacy [facility]

## 2024-10-22 RX ORDER — TADALAFIL 10 MG/1
10 TABLET ORAL DAILY PRN
Qty: 12 TABLET | Refills: 3 | Status: SHIPPED | OUTPATIENT
Start: 2024-10-22 | End: 2025-10-22

## 2024-10-23 DIAGNOSIS — M19.90 ARTHRITIS: Primary | ICD-10-CM

## 2024-10-23 RX ORDER — MELOXICAM 7.5 MG/1
7.5 TABLET ORAL DAILY
Qty: 90 TABLET | Refills: 1 | Status: SHIPPED | OUTPATIENT
Start: 2024-10-23 | End: 2025-04-21

## 2024-11-16 DIAGNOSIS — E78.49 OTHER HYPERLIPIDEMIA: ICD-10-CM

## 2024-11-18 ENCOUNTER — APPOINTMENT (OUTPATIENT)
Dept: AUDIOLOGY | Facility: CLINIC | Age: 64
End: 2024-11-18
Payer: COMMERCIAL

## 2024-11-18 DIAGNOSIS — H93.13 TINNITUS OF BOTH EARS: ICD-10-CM

## 2024-11-18 DIAGNOSIS — H93.293 ABNORMAL AUDITORY PERCEPTION OF BOTH EARS: ICD-10-CM

## 2024-11-18 DIAGNOSIS — H90.3 ASYMMETRICAL SENSORINEURAL HEARING LOSS: Primary | ICD-10-CM

## 2024-11-18 PROCEDURE — 92557 COMPREHENSIVE HEARING TEST: CPT | Performed by: AUDIOLOGIST

## 2024-11-18 PROCEDURE — 92550 TYMPANOMETRY & REFLEX THRESH: CPT | Performed by: AUDIOLOGIST

## 2024-11-18 RX ORDER — ROSUVASTATIN CALCIUM 10 MG/1
10 TABLET, COATED ORAL DAILY
Qty: 90 TABLET | Refills: 1 | Status: SHIPPED | OUTPATIENT
Start: 2024-11-18

## 2024-11-18 NOTE — PROGRESS NOTES
"  AUDIOLOGY ADULT AUDIOMETRIC EVALUATION    Name:  Flavio Bahena  :  1960  Age:  63 y.o.  Date of Evaluation:  2024    Reason for visit: Flavio  is seen in the clinic today at the request of Vj Miller DO, his primary care physician,  for an audiologic evaluation.     HISTORY  Patient reports a long history of severe sensorineural hearing loss.   He reports he's pretty sure he had significant hearing loss as a child.    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    He reports he was fit with bilateral hearing aids by [facility] hearing aid center in 2018.   He reports he rarely wears them as they do not make a difference for him and they \"\" fluorescent light sounds.    He reports his understanding of speech has always been compromised.   He reports he had a long discussion with his primary care physician regarding cochlear implants and came today for an initial audiogram and potential candidacy.   He has constant tinnitus.    No report of dizziness or imbalance today.      EVALUATION  See scanned audiogram: “Media” > “Audiology Report”.      RESULTS  Otoscopic Evaluation:  Right Ear: clear ear canal  Left Ear: clear ear canal    Immittance Measures:  Tympanometry:  Right Ear: Type A, normal tympanic membrane mobility with normal middle ear pressure  Left Ear: Type A, normal tympanic membrane mobility with normal middle ear pressure    Acoustic Reflexes:  Ipsilateral Right Ear: ABSENT  Ipsilateral Left Ear: ABSENT  Contralateral Right Ear: did not evaluate  Contralateral Left Ear: did not evaluate    Distortion Product Otoacoustic Emissions (DPOAEs):  Right Ear: Refer at all frequencies 1KHz-8KHz   Left Ear: Refer at all frequencies 1KHz-8KHz     Audiometry:  Test Technique and Reliability:   Standard audiometry via supra-aural headphones. Reliability is good.    Pure tone air and bone conduction " audiometry:  Right Ear: Mild sensorineural hearing loss at through 250Hz dropping to profound at 500Hz-1500Hz rising to severe at 2KHz-4KHz; profound at 6KHz-8KHz    *significant drop since 4/14/2008   Left Ear: Borderline normal at 125Hz dropping to a moderate to moderately severe sensorineural hearing loss 250Hz through 1KHz sloping to severe to profound at 1500Hz and above.   Overall decrease from 2008    Speech Audiometry (Word Recognition Scores):   Right Ear: 8% at most comfortable listening level of loudness of 85dBHL  Left Ear: 8% at most comfortable listening level of loudness 90dBHL  Binaural presentation at most comfortable listening level of loudness of 85dBHL was 20%    IMPRESSIONS    Right ear: Mild sensorineural hearing loss through 250Hz dropping to profound at 500Hz-1500Hz, rising to severe at 2KHz-4KHz sloping to profound at 6KHz-8KHz.     *significant change since 2008.      Left ear: Borderline normal at 125Hz dropping to moderate to moderately severe sensorineural hearing loss at 250Hz through 1KHz; Severe to profound at 1500Hz and above; overall decrease from 2008    RECOMMENDATIONS  - Referral will be made to cochlear implant team  *right ear demonstrated significant change     - Audiologic evaluation in conjunction with otologic care,  - Follow-up with medical care team as planned.  - Gave patient contact information for  cochlear implant team; phone number and email.      PATIENT EDUCATION  Discussed results, impressions and recommendations with the patient. Questions were addressed and the patient was encouraged to contact our office should concerns arise.    Time for this encounter: 130/218    Brook Cheung M.A., CCC/A   Licensed Audiologist

## 2024-11-18 NOTE — LETTER
"2024     Vj Miller DO  [address] [place] [id number]    Patient: Flavio Bahena   YOB: 1960   Date of Visit: 2024       Dear Dr. Vj Miller DO:    Thank you for referring Flavio Bahena to me for evaluation. Below are my notes for this consultation.  If you have questions, please do not hesitate to call me. I look forward to following your patient along with you.       Sincerely,     Brook Cheung M.A., CCC/A       CC: Pita Avitia RN  ______________________________________________________________________________________      AUDIOLOGY ADULT AUDIOMETRIC EVALUATION    Name:  Flavio Bahena  :  1960  Age:  63 y.o.  Date of Evaluation:  2024    Reason for visit: Flavio  is seen in the clinic today at the request of Vj Miller DO, his primary care physician,  for an audiologic evaluation.     HISTORY  Patient reports a long history of severe sensorineural hearing loss.   He reports he's pretty sure he had significant hearing loss as a child.    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    He reports he was fit with bilateral hearing aids by [facility] hearing aid center in 2018.   He reports he rarely wears them as they do not make a difference for him and they \"\" fluorescent light sounds.    He reports his understanding of speech has always been compromised.   He reports he had a long discussion with his primary care physician regarding cochlear implants and came today for an initial audiogram and potential candidacy.   He has constant tinnitus.    No report of dizziness or imbalance today.      EVALUATION  See scanned audiogram: “Media” > “Audiology Report”.      RESULTS  Otoscopic Evaluation:  Right Ear: clear ear canal  Left Ear: clear ear canal    Immittance Measures:  Tympanometry:  Right Ear: Type A, normal tympanic membrane mobility with normal " middle ear pressure  Left Ear: Type A, normal tympanic membrane mobility with normal middle ear pressure    Acoustic Reflexes:  Ipsilateral Right Ear: ABSENT  Ipsilateral Left Ear: ABSENT  Contralateral Right Ear: did not evaluate  Contralateral Left Ear: did not evaluate    Distortion Product Otoacoustic Emissions (DPOAEs):  Right Ear: Refer at all frequencies 1KHz-8KHz   Left Ear: Refer at all frequencies 1KHz-8KHz     Audiometry:  Test Technique and Reliability:   Standard audiometry via supra-aural headphones. Reliability is good.    Pure tone air and bone conduction audiometry:  Right Ear: Mild sensorineural hearing loss at through 250Hz dropping to profound at 500Hz-1500Hz rising to severe at 2KHz-4KHz; profound at 6KHz-8KHz    *significant drop since 4/14/2008   Left Ear: Borderline normal at 125Hz dropping to a moderate to moderately severe sensorineural hearing loss 250Hz through 1KHz sloping to severe to profound at 1500Hz and above.   Overall decrease from 2008    Speech Audiometry (Word Recognition Scores):   Right Ear: 8% at most comfortable listening level of loudness of 85dBHL  Left Ear: 8% at most comfortable listening level of loudness 90dBHL  Binaural presentation at most comfortable listening level of loudness of 85dBHL was 20%    IMPRESSIONS    Right ear: Mild sensorineural hearing loss through 250Hz dropping to profound at 500Hz-1500Hz, rising to severe at 2KHz-4KHz sloping to profound at 6KHz-8KHz.     *significant change since 2008.      Left ear: Borderline normal at 125Hz dropping to moderate to moderately severe sensorineural hearing loss at 250Hz through 1KHz; Severe to profound at 1500Hz and above; overall decrease from 2008    RECOMMENDATIONS  - Referral will be made to cochlear implant team  *right ear demonstrated significant change     - Audiologic evaluation in conjunction with otologic care,  - Follow-up with medical care team as planned.  - Gave patient contact information for   cochlear implant team; phone number and email.      PATIENT EDUCATION  Discussed results, impressions and recommendations with the patient. Questions were addressed and the patient was encouraged to contact our office should concerns arise.    Time for this encounter: 130/218    Brook Cheung M.A., CCC/A   Licensed Audiologist

## 2024-11-22 DIAGNOSIS — H90.3 SENSORINEURAL HEARING LOSS (SNHL) OF BOTH EARS: ICD-10-CM

## 2024-11-25 ENCOUNTER — TELEPHONE (OUTPATIENT)
Dept: OTOLARYNGOLOGY | Facility: CLINIC | Age: 64
End: 2024-11-25
Payer: COMMERCIAL

## 2024-12-12 LAB
ATRIAL RATE: 86 BPM
P AXIS: 78 DEGREES
P OFFSET: 171 MS
P ONSET: 124 MS
PR INTERVAL: 164 MS
Q ONSET: 206 MS
QRS COUNT: 15 BEATS
QRS DURATION: 152 MS
QT INTERVAL: 394 MS
QTC CALCULATION(BAZETT): 471 MS
QTC FREDERICIA: 444 MS
R AXIS: 128 DEGREES
T AXIS: 64 DEGREES
T OFFSET: 403 MS
VENTRICULAR RATE: 86 BPM

## 2024-12-16 ENCOUNTER — HOSPITAL ENCOUNTER (OUTPATIENT)
Dept: RADIOLOGY | Facility: CLINIC | Age: 64
Discharge: HOME | End: 2024-12-16
Payer: COMMERCIAL

## 2024-12-16 ENCOUNTER — CLINICAL SUPPORT (OUTPATIENT)
Dept: AUDIOLOGY | Facility: CLINIC | Age: 64
End: 2024-12-16
Payer: COMMERCIAL

## 2024-12-16 DIAGNOSIS — H90.3 SENSORINEURAL HEARING LOSS (SNHL) OF BOTH EARS: ICD-10-CM

## 2024-12-16 PROCEDURE — 70480 CT ORBIT/EAR/FOSSA W/O DYE: CPT | Performed by: RADIOLOGY

## 2024-12-16 PROCEDURE — 92626 EVAL AUD FUNCJ 1ST HOUR: CPT | Mod: 59 | Performed by: SOCIAL WORKER

## 2024-12-16 PROCEDURE — 70480 CT ORBIT/EAR/FOSSA W/O DYE: CPT

## 2024-12-16 PROCEDURE — 92627 EVAL AUD FUNCJ EA ADDL 15: CPT | Performed by: SOCIAL WORKER

## 2024-12-16 NOTE — PROGRESS NOTES
Name:  Flavio Bahena  AGE:  64 y.o.  Date of Evaluation:  12/16/24    HISTORY  Flavio Bahena is seen today at the request of Dr. Martinez for a cochlear implant evaluation. Flavio arrives with a history of progressive hearing loss since [employer]. Flavio does not currently utilizes hearing aids due to lack of perceived benefit. He was first fit with hearing aids in 2007 and has had 3 different sets.  He reports tinnitus and history of noise exposure  Flavio Bahena denies ear pain, ear pressure, ear drainage, ear infections, ear surgeries, family history of hearing loss and dizziness/vertigo.      AUDIOLOGIC EVALUATION COMPLETED ON : 11/18/2024    OTOSCOPY  Otoscopic inspection revealed clear canals and visualization of the eardrum bilaterally.    IMMITTANCE  Tympanograms:  Right: Type A tympanogram with normal peak pressure, compliance, and ear canal volume.  Left: Type A tympanogram with normal peak pressure, compliance, and ear canal volume.    Ipsilateral Acoustic Reflexes:  Right: absent at 500-4000 Hz  Left: absent at 500-4000 Hz     DISTORTION PRODUCT OTOACOUSTIC EMISSION (DPOAEs)  Right: Absent 1675-0078 Hz  Left: Absent 1300-5998 Hz    Note: Present OAEs suggest normal cochlear outer hair cell function.  Absent OAEs are consistent with some degree of hearing loss.    AUDIOMETRIC TESTING  Pure tone audiometry conducted via insert headphones from 125 Hz - 8000 Hz with good reliability was consistent with:  Right ear: moderate to profound sensorineural hearing loss.  Left ear:  moderate to profound sensorineural hearing loss.    SPEECH RECOGNITION TESTING (SRT)  SRT was in agreement with pure tone averages bilaterally ( Right: 55 dB HL, Left: 60 dB HL).    WORD RECOGNITION SCORE (WRS)  WRS was (8 %) in the right ear and (8 %) in the left ear using recorded ordered by difficulty NU6 word list.    COCHLEAR IMPLANT EVALUATION FROM : 12/16/2024    AIDED TESTING  Otoscopy indicated clear ear canals and  visible tympanic membranes, bilaterally.    All testing was completed in the soundfield at 0 degrees azimuth. Pure tone testing was obtained using frequency modulating (FM) stimuli,. Word and sentence recognition testing was performed with recorded material at 50 dB HL (50 dB HL = 60 dB A).    R = Right Stacy P90 OR hearing aid   CNC words in quiet at 50 dB HL  = 0 %  AzBio in quiet at 50 dB HL  = 0 %  AzBio in noise at 50 dB HL with 40 dB noise  = 0 %  Pure tone responses from 250 Hz - 6000 Hz were obtained at  25 to 65 dBHL from 250-6000 Hz.    L = Left Stacy P90 OR hearing aid   CNC words in quiet at 50 dB HL  = 4 %  AzBio in quiet at 50 dB HL  = 01 %  AzBio in noise at 50 dB HL with 40 dB noise  = 0 %  Pure tone responses from 250 Hz - 6000 Hz were obtained at  40 to 65 dBHL from 250-6000 Hz.    The Cochlear Implant Quality of Life 35 Profile was administered today. Flavio obtained a raw global score of 17 and a converted global score of 27.69.                       RESULTS  Due to the lack of benefit from appropriate amplification, Flavio Bahena has expressed interest in a cochlear implant. An evaluation of the right ear with appropriately fit amplification revealed 0 % performance on an open-set sentence recognition test. An evaluation of the left ear with appropriately fit amplification revealed 1 % performance on an open-set sentence recognition test.  The audiologic findings demonstrate that Flavio Bahena has partial residual hearing for tonal stimuli and speech detection with hearing aids, demonstrating that the auditory cranial nerve can be stimulated. Testing shows poor sentence and word understanding, even with appropriately programmed hearing aids indicating a severe to profound functional impairment.     Flavio Bahena has an inability to orient sounds in the environment and has even more difficulty understanding speech in noisy environments.  This is highly problematic for safety purposes and  for communication. Thus, the nature of his/her deafness has dramatically and deleteriously affected her overall quality of life, localization, and communication. Not only bilateral severe to profound sensorineural is a FDA approved indication for cochlear implantation, but recent international consensus statement demonstrate the unquestionable benefits and need of cochlear implantation for these patients (Fallon CA, Destiny RH, Tosha DS, et al. Unilateral Cochlear Implants for Severe, Profound, or Moderate Sloping to Profound Bilateral Sensorineural Hearing Loss: A Systematic Review and Consensus Statements. YES[facility] Otolaryngol Head Neck Surg. 2020 Aug 27. doi: 10.1001).     On this basis, unaided and aided testing indicates Flavio Bahena is an audiologic candidate for a cochlear implant in both ears. Flavio Bahena has been counseled regarding the post-operative cochlear implant protocol and are properly motivated and able to participate in the post cochlear implant rehabilitation program. I, and the  CI team have determined that this surgery is medically necessary for the treatment of Flavio Bahena sensorineural hearing loss in both ears.     Flavio Bahena was given a complete  CI team packet that includes general information about cochlear implants, information about follow-up and realistic expectations. The surgical process, post-operative effects, and follow-up appointments were also discussed.  Flavio Bahena was provided with the  brochures and informational handouts.      Flavio Bahenawas informed about the need for the Pneumovax 23 and the Prevnar 13 vaccines. They must consult with their primary care doctor regarding this vaccine.     Flavio Bahena is scheduled for a CT Scan today.  The patient is to contact the clinic with any questions or concerns.    Flavio Bahena will return to clinic to complete a device selection and further discuss realistic expectations.     TREATMENT  PLAN  1. Follow up with Dr. Martinez for cochlear implant consultation.  2. Return to audiology for additional counseling and device selection pending approval by the  CI team.  3. Continue use of binaural hearing aids during all waking moments. Return to managing audiologist for earmold re-make and programming as indicated.     Time: 3563-1377

## 2025-01-20 ENCOUNTER — APPOINTMENT (OUTPATIENT)
Dept: PRIMARY CARE | Facility: CLINIC | Age: 65
End: 2025-01-20
Payer: COMMERCIAL

## 2025-01-20 ENCOUNTER — TELEPHONE (OUTPATIENT)
Dept: PRIMARY CARE | Facility: CLINIC | Age: 65
End: 2025-01-20

## 2025-01-20 DIAGNOSIS — J44.9 CHRONIC OBSTRUCTIVE PULMONARY DISEASE, UNSPECIFIED COPD TYPE (MULTI): Primary | ICD-10-CM

## 2025-01-20 PROBLEM — H90.3 SENSORINEURAL HEARING LOSS (SNHL) OF BOTH EARS: Status: ACTIVE | Noted: 2025-01-20

## 2025-01-20 NOTE — PROGRESS NOTES
History Of Present Illness:  Flavio Bahena is a 64 y.o. male whom presents to me as a new patient for CI consultation, referred here today by audiology. The patient has a history of progressive hearing loss since [employer]. He does not currently utilizes hearing aids due to lack of perceived benefit. Endorses tinnitus and history of noise exposure. Denies otalgia, aural fullness, otorrhea, ear infections, ear surgeries, family history of hearing loss and dizziness/vertigo.       Past Medical History:  He has no past medical history on file.    Surgical History:  He has no past surgical history on file.     Social History:  He reports that he has been smoking cigarettes. He has never used smokeless tobacco. He reports current alcohol use of about 2.0 standard drinks of alcohol per week. He reports that he does not use drugs.    Family History:  No family history on file.     Medications:  Current Outpatient Medications   Medication Instructions    albuterol 90 mcg/actuation inhaler INHALE 1 TO 2 PUFFS EVERY 4 TO 6 HOURS.    ascorbic acid (VITAMIN C) 250 mg, Daily    b complex 0.4 mg tablet 1 tablet, Daily    cholecalciferol (VITAMIN D3) 1,000 Units, Daily    diclofenac (VOLTAREN) 25 mg, As needed    fluticasone-umeclidin-vilanter (Trelegy Ellipta) 100-62.5-25 mcg blister with device 1 puff, inhalation, Daily    magnesium lactate CR (MAGTAB) 84 mg, Daily    magnesium, amino acid chelate, 133 mg tablet 1 tablet, Daily    meloxicam (MOBIC) 7.5 mg, oral, Daily    rosuvastatin (CRESTOR) 10 mg, oral, Daily    tadalafil (CIALIS) 10 mg, oral, Daily PRN    traMADol (ULTRAM) 50 mg, Every 6 hours PRN    [id number] mg tablet Daily        Allergies:  Patient has no known allergies.    Review of Systems:   A comprehensive 10-point review of systems was obtained including constitutional, neurological, HEENT, pulmonary, cardiovascular, genito-urinary, and other pertinent systems and was  "negative except as noted in the HPI.      Physical Exam:  Constitutional   General appearance: Healthy-appearing, well-nourished, well groomed, in no acute distress.   Ability to communicate: Normal communication without aids, normal voice quality.       Head and face: Atraumatic with no masses, lesions, or scarring.   Facial strength: Normal strength and symmetry, no synkinesis or facial tic.     Ears  Otoscopic examination: Bilateral normal otoscopy of the tympanic membrane     Nose: Dorsum symmetric with no visible or palpable deformities.     Oral Cavity/Mouth  Lips, teeth, and gums: Normal lips, gums, and dentition.     Oropharynx: Mucosa moist, no lesions.     Neck: Symmetrical, trachea midline. No masses visible.     Neurological/Psychiatric  Cranial Nerve Examination: II - XII grossly intact.  Orientation to person, place, and time: Normal.  Mood and affect: Normal.     Skin: Normal without rashes or lesions.     Pulmonary  Respiratory effort: Chest expands symmetrically.     Cardiovascular: Good peripheral pulses  Peripheral vascular system: No varicosities, carotid pulse normal, no edema. No jugular venous distension.     Extremities: Appearance of extremities: Normal. Gait normal.       Last Recorded Vitals:  Height 1.803 m (5' 11\"), weight 83.1 kg (183 lb 3.2 oz).    Relevant Results:  I personally reviewed the audiogram from 11/18/24 which demonstrated moderate to profound SNHL with type A tympanograms and 8% word understanding bilaterally.     CI Evaluation (12/16/24 in Media Tab):  Right:  CNC: 0%   AzBio: 0%  AzBio +10: 0%     Right:  CNC: 4%  AzBio: 1%  AzBio +10: 0%     Imaging:  I personally reviewed the CT IAC without contrast from 12/16/24 and this is my impression:  Right Temporal Bone:   - Pre-auricular area: normal, no cysts  - Post-auricular area: normal, no cysts  - Pinna: normal   - EAC: patent, normal soft tissue thickness  - No EAC bony erosion  - TM: normal  - Scutum: Present and normal " in appearance  - Middle Ear Cleft:  - [place]: Present with normal anatomy  - Epitympanum: Open  - Air Cell Tracts:   - Overall Pneumatization: normal   - Mastoid: Opacified, mastoid tip   - Perilabrynthine:  Well aerated   - Peritubal:  Well aerated   - Infracochlear:  Well aerated   - Petrous [place]: Well aerated  - Otic Capsule: Intact, normal anatomy   - Facial nerve: Normal course   - Other Comments: N/A    Left Temporal Bone:   - Pre-auricular area: normal, no cysts  - Post-auricular area: normal, no cysts  - Pinna: normal   - EAC: patent, normal soft tissue thickness  - No EAC bony erosion  - TM: normal  - Scutum: Present and normal in appearance  - Middle Ear Cleft:  - [place]: Present with normal anatomy  - Epitympanum: Open  - Air Cell Tracts:   - Overall Pneumatization: normal   - Mastoid: Opacified, mastoid tip   - Perilabrynthine:  Well aerated   - Peritubal:  Well aerated   - Infracochlear:  Well aerated   - Petrous [place]: Well aerated  - Otic Capsule: Intact, normal anatomy   - Facial nerve: Normal course   - Other Comments: N/A       Assessment/Plan   64 y.o. male whom presents to me as a new patient for CI consultation, referred here today by audiology. The patient has a history of progressive hearing loss since [employer]. Patient meets criteria for implantation in both ears. Normal anatomy on CT scan. Patient will require pneumonia vaccination prior to surgery. Risks and benefits of Cochlear Implantation were discussed with the patient including bleeding, infection, damage to hearing, damage to balance, taste disturbance, damage to facial nerve, and device failure. We will start with the right ear and then move the left ear in a sequential fashion 3 months after the first implantation.       - Prevnar 20 vaccination pre-operatively  - Plan for right Cochlear Implantation     Daniel Santiago MD  Dept. of Otolaryngology - Head and Neck Surgery, PGY-5  ENT Consults: [id number]  ENT Overnight  (5p-6a), and Weekends: [id number]  ENT Head and Neck Surgery Phone: [phone]  ENT Peds: [id number]  ENT Outpatient scheduling number: [phone]     I saw and evaluated the patient. I personally obtained the key and critical portions of the history and physical exam or was physically present for key and critical portions performed by the resident/fellow. I reviewed the resident/fellow's documentation and discussed the patient with the resident/fellow. I agree with the resident/fellow's medical decision making as documented in the note.    Rubina Martinez MD

## 2025-01-20 NOTE — PATIENT INSTRUCTIONS
Welcome to Dr. Martinez's clinic. We are here to assist you through your ENT care at [facility]. Dr. Martinez is an Ear surgeon. This means that she specializes in taking care of patients with complex ear problems.     Dr. Martinez's office number is [phone]. While you may see her at a satellite office, she has a team committed to help meet your healthcare needs at [facility]'s main [place]. This number is the most direct way to communicate with the office.     Susan is Dr. Martinez's  and she answers the office phone from 8am-4pm Mon-Fri. She can help you with many general questions and information. Questions that she cannot answer will be directed to the appropriate staff. You may need to leave a message. In this case, someone from the team will call you back.     Panda Patel RN, is Dr. Martinez's primary nurse and can be reached by calling the office. Panda is in clinic with Dr. Martinez's on Mondays and Tuesdays. Non-urgent calls will be returned on non-clinic days typically Thursdays.     Sometimes, other team members will also be involved in your care. These people may include dieticians, social workers, speech therapists, audiologist, neurologist, and physical therapist. Dr. Martinez will provide these referrals as needed. Please let her know if you would like to request a specific referral.     For your convenience, Dr. Martinez sees patients at several [facility] locations including [facility] and [facility] at the main campus of [facility]. While we try to make your appointments as convenient as possible, occasionally a visit to another location may be necessary to provide the best care for you. We look forward to working with you to meet your healthcare goals.     Dr. Martinez makes every effort to run on time for your appointments. Therefore, if you are more than 30 minutes late unrelated to a scan or another appointment such therapy or audio you will have to  reschedule.

## 2025-01-21 ENCOUNTER — APPOINTMENT (OUTPATIENT)
Dept: OTOLARYNGOLOGY | Facility: CLINIC | Age: 65
End: 2025-01-21
Payer: COMMERCIAL

## 2025-01-21 VITALS — WEIGHT: 183.2 LBS | HEIGHT: 71 IN | BODY MASS INDEX: 25.65 KG/M2

## 2025-01-21 DIAGNOSIS — H90.3 SENSORINEURAL HEARING LOSS (SNHL) OF BOTH EARS: Primary | ICD-10-CM

## 2025-01-21 PROCEDURE — 99204 OFFICE O/P NEW MOD 45 MIN: CPT | Performed by: OTOLARYNGOLOGY

## 2025-01-21 PROCEDURE — 3008F BODY MASS INDEX DOCD: CPT | Performed by: OTOLARYNGOLOGY

## 2025-01-21 RX ORDER — MAGNESIUM L-LACTATE 84 MG
84 TABLET, EXTENDED RELEASE ORAL DAILY
COMMUNITY

## 2025-01-21 RX ORDER — CHOLECALCIFEROL (VITAMIN D3) 25 MCG
1000 TABLET ORAL DAILY
COMMUNITY

## 2025-01-21 RX ORDER — MULTIVITAMIN/IRON/FOLIC ACID 18MG-0.4MG
1 TABLET ORAL DAILY
COMMUNITY

## 2025-01-21 RX ORDER — PNEUMOCOCCAL 20-VALENT CONJUGATE VACCINE 2.2; 2.2; 2.2; 2.2; 2.2; 2.2; 2.2; 2.2; 2.2; 2.2; 2.2; 2.2; 2.2; 2.2; 2.2; 2.2; 4.4; 2.2; 2.2; 2.2 UG/.5ML; UG/.5ML; UG/.5ML; UG/.5ML; UG/.5ML; UG/.5ML; UG/.5ML; UG/.5ML; UG/.5ML; UG/.5ML; UG/.5ML; UG/.5ML; UG/.5ML; UG/.5ML; UG/.5ML; UG/.5ML; UG/.5ML; UG/.5ML; UG/.5ML; UG/.5ML
0.5 INJECTION, SUSPENSION INTRAMUSCULAR ONCE
Qty: 0.5 ML | Refills: 0 | Status: SHIPPED | OUTPATIENT
Start: 2025-01-21 | End: 2025-01-21

## 2025-01-21 ASSESSMENT — PAIN SCALES - GENERAL: PAINLEVEL_OUTOF10: 0-NO PAIN

## 2025-01-21 ASSESSMENT — PATIENT HEALTH QUESTIONNAIRE - PHQ9
2. FEELING DOWN, DEPRESSED OR HOPELESS: NOT AT ALL
SUM OF ALL RESPONSES TO PHQ9 QUESTIONS 1 AND 2: 0
1. LITTLE INTEREST OR PLEASURE IN DOING THINGS: NOT AT ALL

## 2025-01-21 NOTE — LETTER
January 21, 2025     Vj Miller DO  [address] [place] [address] [place]  [facility] [id number]    Patient: Flavio Bahena   YOB: 1960   Date of Visit: 1/21/2025       Dear Dr. Vj Miller DO:    Thank you for referring Flavio Bahena to me for evaluation. Below are my notes for this consultation.  If you have questions, please do not hesitate to call me. I look forward to following your patient along with you.       Sincerely,     Rubina Martinez MD      CC: No Recipients  ______________________________________________________________________________________    History Of Present Illness:  Flavio Bahena is a 64 y.o. male whom presents to me as a new patient for CI consultation, referred here today by audiology. The patient has a history of progressive hearing loss since [employer]. He does not currently utilizes hearing aids due to lack of perceived benefit. Endorses tinnitus and history of noise exposure. Denies otalgia, aural fullness, otorrhea, ear infections, ear surgeries, family history of hearing loss and dizziness/vertigo.       Past Medical History:  He has no past medical history on file.    Surgical History:  He has no past surgical history on file.     Social History:  He reports that he has been smoking cigarettes. He has never used smokeless tobacco. He reports current alcohol use of about 2.0 standard drinks of alcohol per week. He reports that he does not use drugs.    Family History:  No family history on file.     Medications:  Current Outpatient Medications   Medication Instructions   • albuterol 90 mcg/actuation inhaler INHALE 1 TO 2 PUFFS EVERY 4 TO 6 HOURS.   • ascorbic acid (VITAMIN C) 250 mg, Daily   • b complex 0.4 mg tablet 1 tablet, Daily   • cholecalciferol (VITAMIN D3) 1,000 Units, Daily   • diclofenac (VOLTAREN) 25 mg, As needed   • fluticasone-umeclidin-vilanter (Trelegy Ellipta) 100-62.5-25 mcg blister with device 1 puff, inhalation, Daily   • magnesium lactate CR (MAGTAB)  "84 mg, Daily   • magnesium, amino acid chelate, 133 mg tablet 1 tablet, Daily   • meloxicam (MOBIC) 7.5 mg, oral, Daily   • rosuvastatin (CRESTOR) 10 mg, oral, Daily   • tadalafil (CIALIS) 10 mg, oral, Daily PRN   • traMADol (ULTRAM) 50 mg, Every 6 hours PRN   • roman-qjd-wef-qpmc-hor [phone] mg tablet Daily        Allergies:  Patient has no known allergies.    Review of Systems:   A comprehensive 10-point review of systems was obtained including constitutional, neurological, HEENT, pulmonary, cardiovascular, genito-urinary, and other pertinent systems and was negative except as noted in the HPI.      Physical Exam:  Constitutional   General appearance: Healthy-appearing, well-nourished, well groomed, in no acute distress.   Ability to communicate: Normal communication without aids, normal voice quality.       Head and face: Atraumatic with no masses, lesions, or scarring.   Facial strength: Normal strength and symmetry, no synkinesis or facial tic.     Ears  Otoscopic examination: Bilateral normal otoscopy of the tympanic membrane     Nose: Dorsum symmetric with no visible or palpable deformities.     Oral Cavity/Mouth  Lips, teeth, and gums: Normal lips, gums, and dentition.     Oropharynx: Mucosa moist, no lesions.     Neck: Symmetrical, trachea midline. No masses visible.     Neurological/Psychiatric  Cranial Nerve Examination: II - XII grossly intact.  Orientation to person, place, and time: Normal.  Mood and affect: Normal.     Skin: Normal without rashes or lesions.     Pulmonary  Respiratory effort: Chest expands symmetrically.     Cardiovascular: Good peripheral pulses  Peripheral vascular system: No varicosities, carotid pulse normal, no edema. No jugular venous distension.     Extremities: Appearance of extremities: Normal. Gait normal.       Last Recorded Vitals:  Height 1.803 m (5' 11\"), weight 83.1 kg (183 lb 3.2 oz).    Relevant Results:  I personally reviewed the audiogram from " 11/18/24 which demonstrated moderate to profound SNHL with type A tympanograms and 8% word understanding bilaterally.     CI Evaluation (12/16/24 in Media Tab):  Right:  CNC: 0%   AzBio: 0%  AzBio +10: 0%     Right:  CNC: 4%  AzBio: 1%  AzBio +10: 0%     Imaging:  I personally reviewed the CT IAC without contrast from 12/16/24 and this is my impression:  Right Temporal Bone:   - Pre-auricular area: normal, no cysts  - Post-auricular area: normal, no cysts  - Pinna: normal   - EAC: patent, normal soft tissue thickness  - No EAC bony erosion  - TM: normal  - Scutum: Present and normal in appearance  - Middle Ear Cleft:  - Ossicles: Present with normal anatomy  - Epitympanum: Open  - Air Cell Tracts:   - Overall Pneumatization: normal   - Mastoid: Opacified, mastoid tip   - Perilabrynthine:  Well aerated   - Peritubal:  Well aerated   - Infracochlear:  Well aerated   - Petrous [place]gerton: Well aerated  - Otic Capsule: Intact, normal anatomy   - Facial nerve: Normal course   - Other Comments: N/A    Left Temporal Bone:   - Pre-auricular area: normal, no cysts  - Post-auricular area: normal, no cysts  - Pinna: normal   - EAC: patent, normal soft tissue thickness  - No EAC bony erosion  - TM: normal  - Scutum: Present and normal in appearance  - Middle Ear Cleft:  - Ossicles: Present with normal anatomy  - Epitympanum: Open  - Air Cell Tracts:   - Overall Pneumatization: normal   - Mastoid: Opacified, mastoid tip   - Perilabrynthine:  Well aerated   - Peritubal:  Well aerated   - Infracochlear:  Well aerated   - Petrous Edgerton: Well aerated  - Otic Capsule: Intact, normal anatomy   - Facial nerve: Normal course   - Other Comments: N/A       Assessment/Plan  64 y.o. male whom presents to me as a new patient for CI consultation, referred here today by audiology. The patient has a history of progressive hearing loss since [employer]. Patient meets criteria for implantation in both ears. Normal anatomy on CT scan. Patient  will require pneumonia vaccination prior to surgery. Risks and benefits of Cochlear Implantation were discussed with the patient including bleeding, infection, damage to hearing, damage to balance, taste disturbance, damage to facial nerve, and device failure. We will start with the right ear and then move the left ear in a sequential fashion 3 months after the first implantation.       - Prevnar 20 vaccination pre-operatively  - Plan for right Cochlear Implantation     Daniel Santiago MD  Dept. of Otolaryngology - Head and Neck Surgery, PGY-5  ENT Consults: [id number]  ENT Overnight (5p-6a), and Weekends: [id number]  ENT Head and Neck Surgery Phone: [phone]  ENT Peds: [id number]  ENT Outpatient scheduling number: [phone]     I saw and evaluated the patient. I personally obtained the key and critical portions of the history and physical exam or was physically present for key and critical portions performed by the resident/fellow. I reviewed the resident/fellow's documentation and discussed the patient with the resident/fellow. I agree with the resident/fellow's medical decision making as documented in the note.    Rubina Martinez MD

## 2025-01-28 DIAGNOSIS — J44.9 CHRONIC OBSTRUCTIVE PULMONARY DISEASE, UNSPECIFIED COPD TYPE (MULTI): Primary | ICD-10-CM

## 2025-01-28 RX ORDER — BUDESONIDE, GLYCOPYRROLATE, AND FORMOTEROL FUMARATE 160; 9; 4.8 UG/1; UG/1; UG/1
2 AEROSOL, METERED RESPIRATORY (INHALATION) 2 TIMES DAILY
Qty: 10.7 G | Refills: 2 | Status: SHIPPED | OUTPATIENT
Start: 2025-01-28

## 2025-03-06 ENCOUNTER — CLINICAL SUPPORT (OUTPATIENT)
Dept: AUDIOLOGY | Facility: CLINIC | Age: 65
End: 2025-03-06
Payer: COMMERCIAL

## 2025-03-06 DIAGNOSIS — H90.3 SENSORINEURAL HEARING LOSS (SNHL) OF BOTH EARS: ICD-10-CM

## 2025-03-06 PROCEDURE — 92700 UNLISTED ORL SERVICE/PX: CPT | Mod: AUDSP | Performed by: SOCIAL WORKER

## 2025-03-06 NOTE — PROGRESS NOTES
Flavio Bahena was seen on 3/6/25 for a cochlear implant device demonstration and counseling. He is scheduled for a Right ear Nucleus cochlear implant surgery on 4/21/25 with Dr. Martinez.    RECALL:  Flavio Bahena is seen today at the request of Dr. Martinez for a cochlear implant evaluation. Flavio arrives with a history of progressive hearing loss since [employer]. Flavio does not currently utilizes hearing aids due to lack of perceived benefit. He was first fit with hearing aids in 2007 and has had 3 different sets.  He reports tinnitus and history of noise exposure  Flavio Bahena denies ear pain, ear pressure, ear drainage, ear infections, ear surgeries, family history of hearing loss and dizziness/vertigo.       AUDIOLOGIC EVALUATION COMPLETED ON : 11/18/2024  COCHLEAR IMPLANT EVALUATION FROM : 12/16/2024  R = Right Stacy P90 NM hearing aid   CNC words in quiet at 50 dB HL  = 0 %  AzBio in quiet at 50 dB HL  = 0 %  AzBio in noise at 50 dB HL with 40 dB noise  = 0 %  Pure tone responses from 250 Hz - 6000 Hz were obtained at  25 to 65 dBHL from 250-6000 Hz.     L = Left Stacy P90 NM hearing aid   CNC words in quiet at 50 dB HL  = 4 %  AzBio in quiet at 50 dB HL  = 01 %  AzBio in noise at 50 dB HL with 40 dB noise  = 0 %  Pure tone responses from 250 Hz - 6000 Hz were obtained at  40 to 65 dBHL from 250-6000 Hz.     Device Demonstration:  Mr. Bahena would like the Nucleus 8 behind the ear processor in gray with a Mini Microphone, Aqua+ and TV Streamer.    He is scheduled for his post operative activation appointments  These were printed off for him today    Treatment Plan:  Pursue the right cochlear implant surgery  Return for activation of right cochlear implant post surgery

## 2025-04-03 ENCOUNTER — PRE-ADMISSION TESTING (OUTPATIENT)
Dept: PREADMISSION TESTING | Facility: HOSPITAL | Age: 65
End: 2025-04-03
Payer: COMMERCIAL

## 2025-04-03 VITALS
WEIGHT: 177 LBS | BODY MASS INDEX: 24.78 KG/M2 | HEART RATE: 70 BPM | SYSTOLIC BLOOD PRESSURE: 119 MMHG | DIASTOLIC BLOOD PRESSURE: 78 MMHG | OXYGEN SATURATION: 95 % | HEIGHT: 71 IN | TEMPERATURE: 97.4 F

## 2025-04-03 DIAGNOSIS — H90.3 SENSORINEURAL HEARING LOSS (SNHL) OF BOTH EARS: Primary | ICD-10-CM

## 2025-04-03 LAB
ANION GAP SERPL CALC-SCNC: 15 MMOL/L (ref 10–20)
BUN SERPL-MCNC: 20 MG/DL (ref 6–23)
CALCIUM SERPL-MCNC: 10 MG/DL (ref 8.6–10.6)
CHLORIDE SERPL-SCNC: 103 MMOL/L (ref 98–107)
CO2 SERPL-SCNC: 27 MMOL/L (ref 21–32)
CREAT SERPL-MCNC: 0.96 MG/DL (ref 0.5–1.3)
EGFRCR SERPLBLD CKD-EPI 2021: 89 ML/MIN/1.73M*2
ERYTHROCYTE [DISTWIDTH] IN BLOOD BY AUTOMATED COUNT: 13.4 % (ref 11.5–14.5)
GLUCOSE SERPL-MCNC: 88 MG/DL (ref 74–99)
HCT VFR BLD AUTO: 51.4 % (ref 41–52)
HGB BLD-MCNC: 17.6 G/DL (ref 13.5–17.5)
MCH RBC QN AUTO: 32 PG (ref 26–34)
MCHC RBC AUTO-ENTMCNC: 34.2 G/DL (ref 32–36)
MCV RBC AUTO: 94 FL (ref 80–100)
NRBC BLD-RTO: 0 /100 WBCS (ref 0–0)
PLATELET # BLD AUTO: 316 X10*3/UL (ref 150–450)
POTASSIUM SERPL-SCNC: 4.7 MMOL/L (ref 3.5–5.3)
RBC # BLD AUTO: 5.5 X10*6/UL (ref 4.5–5.9)
SODIUM SERPL-SCNC: 140 MMOL/L (ref 136–145)
WBC # BLD AUTO: 8.7 X10*3/UL (ref 4.4–11.3)

## 2025-04-03 PROCEDURE — 80048 BASIC METABOLIC PNL TOTAL CA: CPT

## 2025-04-03 PROCEDURE — 36415 COLL VENOUS BLD VENIPUNCTURE: CPT

## 2025-04-03 PROCEDURE — 85027 COMPLETE CBC AUTOMATED: CPT

## 2025-04-03 PROCEDURE — 87081 CULTURE SCREEN ONLY: CPT

## 2025-04-03 PROCEDURE — 99205 OFFICE O/P NEW HI 60 MIN: CPT | Performed by: NURSE PRACTITIONER

## 2025-04-03 ASSESSMENT — DUKE ACTIVITY SCORE INDEX (DASI)
CAN YOU DO LIGHT WORK AROUND THE HOUSE LIKE DUSTING OR WASHING DISHES: YES
CAN YOU PARTICIPATE IN STRENOUS SPORTS LIKE SWIMMING, SINGLES TENNIS, FOOTBALL, BASKETBALL, OR SKIING: YES
CAN YOU PARTICIPATE IN MODERATE RECREATIONAL ACTIVITIES LIKE GOLF, BOWLING, DANCING, DOUBLES TENNIS OR THROWING A BASEBALL OR FOOTBALL: YES
CAN YOU RUN A SHORT DISTANCE: YES
CAN YOU DO YARD WORK LIKE RAKING LEAVES, WEEDING OR PUSHING A MOWER: YES
CAN YOU WALK INDOORS, SUCH AS AROUND YOUR HOUSE: YES
CAN YOU CLIMB A FLIGHT OF STAIRS OR WALK UP A HILL: YES
DASI METS SCORE: 9.9
CAN YOU DO HEAVY WORK AROUND THE HOUSE LIKE SCRUBBING FLOORS OR LIFTING AND MOVING HEAVY FURNITURE: YES
CAN YOU DO MODERATE WORK AROUND THE HOUSE LIKE VACUUMING, SWEEPING FLOORS OR CARRYING GROCERIES: YES
CAN YOU WALK A BLOCK OR TWO ON LEVEL GROUND: YES
CAN YOU HAVE SEXUAL RELATIONS: YES
CAN YOU TAKE CARE OF YOURSELF (EAT, DRESS, BATHE, OR USE TOILET): YES
TOTAL_SCORE: 58.2

## 2025-04-03 ASSESSMENT — ENCOUNTER SYMPTOMS
NUMBNESS: 1
ENDOCRINE NEGATIVE: 1
SHORTNESS OF BREATH: 1
CONSTITUTIONAL NEGATIVE: 1
BRUISES/BLEEDS EASILY: 1
CARDIOVASCULAR NEGATIVE: 1
WOUND: 1
COUGH: 1
DIFFICULTY URINATING: 1
ARTHRALGIAS: 1
NECK STIFFNESS: 1

## 2025-04-03 ASSESSMENT — LIFESTYLE VARIABLES: SMOKING_STATUS: SMOKER

## 2025-04-03 NOTE — CPM/PAT H&P
CPM/PAT Evaluation       Name: Flavio Bahena (Flavio Bahena)  /Age: 1960/64 y.o.     Visit Type:   In-Person       Chief Complaint: Sensorineural hearing loss (SNHL) of both ears    HPI  Pt is a 64 year old male with bilateral SNHL referred to PAT by Dr. Martinez for preoperative evaluation in advance of a right side cochlear implant on 25.  Prior to Admission medications    Medication Sig Start Date End Date Taking? Authorizing Provider   albuterol 90 mcg/actuation inhaler INHALE 1 TO 2 PUFFS EVERY 4 TO 6 HOURS. 24  Yes Madelyn Pat, DO   ascorbic acid (Vitamin C) 250 MG chewable tablet Chew 1 tablet (250 mg) early in the morning..   Yes Historical Provider, MD   b complex 0.4 mg tablet Take 1 tablet by mouth once daily.   Yes Historical Provider, MD   budesonide-glycopyr-formoterol (Breztri Aerosphere) 160-9-4.8 mcg/actuation HFA aerosol inhaler Inhale 2 puffs 2 times a day. 25  Yes Vj Miller,    cholecalciferol (Vitamin D3) 25 MCG (1000 UT) tablet Take 1 tablet (1,000 Units) by mouth once daily.   Yes Historical Provider, MD   fluticasone-umeclidin-vilanter (Trelegy Ellipta) 100-62.5-25 mcg blister with device Inhale 1 puff once daily. 10/21/24 10/21/25 Yes Dakotah Jarrell, DO   magnesium lactate CR (Magtab) 84 mg ER tablet Take 1 tablet (84 mg) by mouth once daily.   Yes Historical Provider, MD   magnesium, amino acid chelate, 133 mg tablet Take 1 tablet (133 mg) by mouth early in the morning..   Yes Historical Provider, MD   meloxicam (Mobic) 7.5 mg tablet Take 1 tablet (7.5 mg) by mouth once daily. 10/23/24 4/21/25 Yes Vj Miller DO   rosuvastatin (Crestor) 10 mg tablet TAKE 1 TABLET BY MOUTH EVERY DAY 24  Yes Dakotah Jarrell DO   tadalafil (Cialis) 10 mg tablet Take 1 tablet (10 mg) by mouth once daily as needed for erectile dysfunction. 10/22/24 10/22/25 Yes Vj Miller,    traMADol (Ultram) 50 mg tablet Take 1 tablet (50 mg) by mouth every 6 hours if needed.  "2/12/24  Yes Historical Provider, MD koehlerlvoa-[id number]-[id number]-yab-[id number]-hor [phone] mg tablet Take by mouth early in the morning..   Yes Historical Provider, MD   diclofenac (Voltaren) 25 mg EC tablet Take 1 tablet (25 mg) by mouth if needed. Do not crush, chew, or split.  Patient not taking: Reported on 1/21/2025    Historical Provider, MD HARMON ROS:   Constitutional:   neg    Neuro/Psych:    numbness (fingertips)  Eyes:    use of corrective lenses  Ears:    hearing loss   tinnitus  Nose:   Mouth:   Throat:   Neck:    neck stiffness  Cardio:   neg    Respiratory:    cough (smokers)   shortness of breath  Endocrine:   neg    GI:   :    difficulty urinating (weak stream, delayed emptying)  Musculoskeletal:    arthralgias  Hematologic:    bruises/bleeds easily  Skin:   sores/wound (LLE)      Physical Exam  Vitals reviewed.   Constitutional:       Appearance: Normal appearance.   HENT:      Head: Normocephalic and atraumatic.      Nose: Nose normal.      Mouth/Throat:      Mouth: Mucous membranes are moist.   Cardiovascular:      Rate and Rhythm: Normal rate and regular rhythm.      Pulses: Normal pulses.      Heart sounds: Normal heart sounds.   Pulmonary:      Effort: Pulmonary effort is normal.      Breath sounds: Normal breath sounds.   Abdominal:      Palpations: Abdomen is soft.   Musculoskeletal:         General: Normal range of motion.      Cervical back: Normal range of motion.   Skin:     General: Skin is warm.   Neurological:      General: No focal deficit present.      Mental Status: He is alert and oriented to person, place, and time.   Psychiatric:         Mood and Affect: Mood normal.         Behavior: Behavior normal.          PAT AIRWAY:   Airway:     Mallampati::  II    TM distance::  >3 FB    Neck ROM::  Full   Needs dental work      Testing/Diagnostic:     Patient Specialist/PCP:     Visit Vitals  /78   Pulse 70   Temp 36.3 °C (97.4 °F)   Ht 1.803 m (5' 11\")   Wt 80.3 kg (177 lb) "   SpO2 95%   BMI 24.69 kg/m²   Smoking Status Every Day   BSA 2.01 m²       DASI Risk Score      Flowsheet Row Pre-Admission Testing from 4/3/20[facility] in [facility]   Can you take care of yourself (eat, dress, bathe, or use toilet)?  2.75 filed at 04/03/2025 1017   Can you walk indoors, such as around your house? 1.75 filed at 04/03/2025 1017   Can you walk a block or two on level ground?  2.75 filed at 04/03/2025 1017   Can you climb a flight of stairs or walk up a hill? 5.5 filed at 04/03/2025 1017   Can you run a short distance? 8 filed at 04/03/2025 1017   Can you do light work around the house like dusting or washing dishes? 2.7 filed at 04/03/2025 1017   Can you do moderate work around the house like vacuuming, sweeping floors or carrying groceries? 3.5 filed at 04/03/2025 1017   Can you do heavy work around the house like scrubbing floors or lifting and moving heavy furniture?  8 filed at 04/03/2025 1017   Can you do yard work like raking leaves, weeding or pushing a mower? 4.5 filed at 04/03/2025 1017   Can you have sexual relations? 5.25 filed at 04/03/2025 1017   Can you participate in moderate recreational activities like golf, bowling, dancing, doubles tennis or throwing a baseball or football? 6 filed at 04/03/2025 1017   Can you participate in strenous sports like swimming, singles tennis, football, basketball, or skiing? 7.5 filed at 04/03/2025 1017   DASI SCORE 58.2 filed at 04/03/2025 1017   METS Score (Will be calculated only when all the questions are answered) 9.9 filed at 04/03/2025 1017          Caprini DVT Assessment    No data to display       Modified Frailty Index    No data to display       [id number] Stroke Risk Points  Current as of just now        N/A 0 to 9 Points:      Last Change: N/A          The [id number] risk score (Vivienne DEMPSEY, et al. 2009. © 2010 American College [employer]) quantifies the risk of stroke for a patient with atrial fibrillation. For patients  without atrial fibrillation or under the age of 18 this score appears as N/A. Higher score values generally indicate higher risk of stroke.        This score is not applicable to this patient. Components are not calculated.          Revised Cardiac Risk Index    No data to display       Apfel Simplified Score    No data to display       Risk Analysis Index Results This Encounter    No data found in the last 10 encounters.       Stop Bang Score      Flowsheet Row Pre-Admission Testing [facility]/3/2025 in [facility]   Do you snore loudly? 1 filed at 04/03/2025 1017   Do you often feel tired or fatigued after your sleep? 1 filed at 04/03/2025 1017   Has anyone ever observed you stop breathing in your sleep? 0 filed at 04/03/2025 1017   Do you have or are you being treated for high blood pressure? 0 filed at 04/03/2025 1019   Recent BMI (Calculated) 24.7 filed at 04/03/2025 1017   Is BMI greater than 35 kg/m2? 0=No filed at 04/03/2025 1017   Age older than 50 years old? 1=Yes filed at 04/03/2025 1017   Is your neck circumference greater than 17 inches (Male) or 16 inches (Female)? 0 filed at 04/03/2025 1017   Gender - Male 1=Yes filed at 04/03/2025 1017   STOP-BANG Total Score 5 filed at 04/03/2025 1017          Prodigy: High Risk  Total Score: 19              Prodigy Age Score      Prodigy Gender Score     Prodigy Previous Opioid Use Score           ARISCAT Score for Postoperative Pulmonary Complications    No data to display       Finch Perioperative Risk for Myocardial Infarction or Cardiac Arrest (YOHAN)    No data to display         Assessment and Plan:     Anesthesia  The patient denies problems with anesthesia in the past such as PONV, prolonged sedation, awareness, dental damage, aspiration, cardiac arrest, difficult intubation, or unexpected hospital admissions.     Neurology  The patient has no neurological diagnoses or significant findings on chart review, clinical presentation, and evaluation.  No grossly apparent neurological perioperative risk. The patient is at increased risk for postoperative delirium secondary to sensory Impairment. The patient is at increased risk for perioperative stroke secondary to hyperlipidemia, general anesthesia, operative time >2.5 hours. Handouts for preoperative brain exercises given to patient.    HEENT/Airway  The patient has bilateral SNHL. No documented or reported history of airway difficulty.     Cardiovascular  The patient is scheduled for non-cardiac surgery associated with elevated risk. The patient has no major cardiac contraindications to non- cardiac surgery.  RCRI  The patient meets 0 RCRI criteria and therefor has a 3.9% risk of major adverse cardiac complications.  METS  The patient's functional capacity is greater than 4 METS.  EKG  The patient has no EKG or echocardiographic changes concerning for myocardial ischemia.   Heart Failure  The patient has no known history of heart failure.  Additionally, the patient reports no symptoms of heart failure and demonstrates no signs of heart failure.  Hypertension Evaluation  The patient has no known history of hypertension and has a normal blood pressure today.  Heart Rhythm Evaluation  The patient has no history of arrhythmias.  Heart Valve Evaluation  The patient has no known history of valvular heart disease. The patient has no symptoms or physical exam findings to suggest valvular heart disease.  Cardiology Evaluation  The patient is not followed by cardiology.    The patient has a 30-day risk for MACE of 0 predictors, 3.9% risk for cardiac death, nonfatal myocardial infarction, and nonfatal cardiac arrest.  YOHAN score which indicates a 0.1% risk of intraoperative or 30-day postoperative MACE (major adverse cardiac event).    Pulmonary  The patient has ? COPD and SOB. The patient is at increased risk of perioperative pulmonary complications secondary to ongoing tobacco abuse, advanced age greater than 60.    The  patient has a stop bang score of 5, which places patient at high risk for having CRISS.    ARISCAT 26, Intermediate, 13.3% risk of in-hospital postoperative pulmonary complications  PRODIGY 21, high risk of respiratory depression episode. Patient given PI sheet for preoperative deep breathing exercises.    Hematology  No diagnoses or significant findings on chart review or clinical presentation and evaluation.  Antiplatelet management   The patient is not currently receiving antiplatelet therapy.  Anticoagulation management  The patient is not currently receiving anticoagulation therapy.    Caprini score 8, high risk of perioperative VTE.     Patient instructed to ambulate as soon as possible postoperatively to decrease thromboembolic risk. Initiate mechanical DVT prophylaxis as soon as possible and initiate chemical prophylaxis when deemed safe from a bleeding standpoint post surgery.     Transfusion Evaluation  T&S not obtained. Low likelihood for perioperative transfusion of blood or blood products.    Gastrointestinal  No diagnoses or significant findings on chart review or clinical presentation and evaluation.    Eat 10- 0,  self-perceived oropharyngeal dysphagia scale (0-40)     Genitourinary  The patient has h/o nephrolithiasis    Renal  No renal diagnoses or significant findings on chart review or clinical presentation and evaluation. The patient has specific risk factors associated with increased risk of perioperative renal complications related to age greater than 55, male gender, COPD.    Musculoskeletal  No diagnoses or significant findings on chart review or clinical presentation and evaluation.    Endocrine  Diabetes Evaluation  The patient has no history of diabetes mellitus.  Thyroid Disease Evaluation  The patient has no history of thyroid disease.    ID  No diagnoses or significant findings on chart review or clinical presentation and evaluation. MRSA screening obtained. Prescriptions and  instructions given for Hibiclens and Peridex.    -Preoperative medication instructions were provided and reviewed with the patient.  Any additional testing or evaluation was explained to the patient.  NPO Instructions were discussed, and the patient's questions were answered prior to conclusion of this encounter. Patient verbalized understanding of preoperative instructions. After Visit Summary given.      Recent Results (from the past week)   Staphylococcus aureus/MRSA colonization, Culture    Collection Time: 04/03/25 11:14 AM    Specimen: Nares/Axilla/Groin; Swab   Result Value Ref Range    Staph/MRSA Screen Culture No Staphylococcus aureus isolated    CBC    Collection Time: 04/03/25 11:14 AM   Result Value Ref Range    WBC 8.7 4.4 - 11.3 x10*3/uL    nRBC 0.0 0.0 - 0.0 /100 WBCs    RBC 5.50 4.50 - 5.90 x10*6/uL    Hemoglobin 17.6 (H) 13.5 - 17.5 g/dL    Hematocrit 51.4 41.0 - 52.0 %    MCV 94 80 - 100 fL    MCH 32.0 26.0 - 34.0 pg    MCHC 34.2 32.0 - 36.0 g/dL    RDW 13.4 11.5 - 14.5 %    Platelets 316 150 - 450 x10*3/uL   Basic Metabolic Panel    Collection Time: 04/03/25 11:14 AM   Result Value Ref Range    Glucose 88 74 - 99 mg/dL    Sodium 140 136 - 145 mmol/L    Potassium 4.7 3.5 - 5.3 mmol/L    Chloride 103 98 - 107 mmol/L    Bicarbonate 27 21 - 32 mmol/L    Anion Gap 15 10 - 20 mmol/L    Urea Nitrogen 20 6 - 23 mg/dL    Creatinine 0.96 0.50 - 1.30 mg/dL    eGFR 89 >60 mL/min/1.73m*2    Calcium 10.0 8.6 - 10.6 mg/dL

## 2025-04-03 NOTE — H&P (VIEW-ONLY)
CPM/PAT Evaluation       Name: Flavio Bahena (Flavio Bahena)  /Age: 1960/64 y.o.     Visit Type:   In-Person       Chief Complaint: Sensorineural hearing loss (SNHL) of both ears    HPI  Pt is a 64 year old male with bilateral SNHL referred to PAT by Dr. Martinez for preoperative evaluation in advance of a right side cochlear implant on 25.  Prior to Admission medications    Medication Sig Start Date End Date Taking? Authorizing Provider   albuterol 90 mcg/actuation inhaler INHALE 1 TO 2 PUFFS EVERY 4 TO 6 HOURS. 24  Yes Madelyn Pat, DO   ascorbic acid (Vitamin C) 250 MG chewable tablet Chew 1 tablet (250 mg) early in the morning..   Yes Historical Provider, MD   b complex 0.4 mg tablet Take 1 tablet by mouth once daily.   Yes Historical Provider, MD   budesonide-glycopyr-formoterol (Breztri Aerosphere) 160-9-4.8 mcg/actuation HFA aerosol inhaler Inhale 2 puffs 2 times a day. 25  Yes Vj Miller,    cholecalciferol (Vitamin D3) 25 MCG (1000 UT) tablet Take 1 tablet (1,000 Units) by mouth once daily.   Yes Historical Provider, MD   fluticasone-umeclidin-vilanter (Trelegy Ellipta) 100-62.5-25 mcg blister with device Inhale 1 puff once daily. 10/21/24 10/21/25 Yes Dakotah Jarrell, DO   magnesium lactate CR (Magtab) 84 mg ER tablet Take 1 tablet (84 mg) by mouth once daily.   Yes Historical Provider, MD   magnesium, amino acid chelate, 133 mg tablet Take 1 tablet (133 mg) by mouth early in the morning..   Yes Historical Provider, MD   meloxicam (Mobic) 7.5 mg tablet Take 1 tablet (7.5 mg) by mouth once daily. 10/23/24 4/21/25 Yes Vj Miller DO   rosuvastatin (Crestor) 10 mg tablet TAKE 1 TABLET BY MOUTH EVERY DAY 24  Yes Dakotah Jarrell DO   tadalafil (Cialis) 10 mg tablet Take 1 tablet (10 mg) by mouth once daily as needed for erectile dysfunction. 10/22/24 10/22/25 Yes Vj Miller,    traMADol (Ultram) 50 mg tablet Take 1 tablet (50 mg) by mouth every 6 hours if needed.  "2/12/24  Yes Historical Provider, MD [id number] [phone] mg tablet Take by mouth early in the morning..   Yes Historical Provider, MD   diclofenac (Voltaren) 25 mg EC tablet Take 1 tablet (25 mg) by mouth if needed. Do not crush, chew, or split.  Patient not taking: Reported on 1/21/2025    Historical Provider, MD HARMON ROS:   Constitutional:   neg    Neuro/Psych:    numbness (fingertips)  Eyes:    use of corrective lenses  Ears:    hearing loss   tinnitus  Nose:   Mouth:   Throat:   Neck:    neck stiffness  Cardio:   neg    Respiratory:    cough (smokers)   shortness of breath  Endocrine:   neg    GI:   :    difficulty urinating (weak stream, delayed emptying)  Musculoskeletal:    arthralgias  Hematologic:    bruises/bleeds easily  Skin:   sores/wound (LLE)      Physical Exam  Vitals reviewed.   Constitutional:       Appearance: Normal appearance.   HENT:      Head: Normocephalic and atraumatic.      Nose: Nose normal.      Mouth/Throat:      Mouth: Mucous membranes are moist.   Cardiovascular:      Rate and Rhythm: Normal rate and regular rhythm.      Pulses: Normal pulses.      Heart sounds: Normal heart sounds.   Pulmonary:      Effort: Pulmonary effort is normal.      Breath sounds: Normal breath sounds.   Abdominal:      Palpations: Abdomen is soft.   Musculoskeletal:         General: Normal range of motion.      Cervical back: Normal range of motion.   Skin:     General: Skin is warm.   Neurological:      General: No focal deficit present.      Mental Status: He is alert and oriented to person, place, and time.   Psychiatric:         Mood and Affect: Mood normal.         Behavior: Behavior normal.          PAT AIRWAY:   Airway:     Mallampati::  II    TM distance::  >3 FB    Neck ROM::  Full   Needs dental work      Testing/Diagnostic:     Patient Specialist/PCP:     Visit Vitals  /78   Pulse 70   Temp 36.3 °C (97.4 °F)   Ht 1.803 m (5' 11\")   Wt 80.3 kg (177 lb) "   SpO2 95%   BMI 24.69 kg/m²   Smoking Status Every Day   BSA 2.01 m²       DASI Risk Score      Flowsheet Row Pre-Admission Testing from 4/3/2025 in [facility]   Can you take care of yourself (eat, dress, bathe, or use toilet)?  2.75 filed at 04/03/2025 1017   Can you walk indoors, such as around your house? 1.75 filed at 04/03/2025 1017   Can you walk a block or two on level ground?  2.75 filed at 04/03/2025 1017   Can you climb a flight of stairs or walk up a hill? 5.5 filed at 04/03/2025 1017   Can you run a short distance? 8 filed at 04/03/2025 1017   Can you do light work around the house like dusting or washing dishes? 2.7 filed at 04/03/2025 1017   Can you do moderate work around the house like vacuuming, sweeping floors or carrying groceries? 3.5 filed at 04/03/2025 1017   Can you do heavy work around the house like scrubbing floors or lifting and moving heavy furniture?  8 filed at 04/03/2025 1017   Can you do yard work like raking leaves, weeding or pushing a mower? 4.5 filed at 04/03/2025 1017   Can you have sexual relations? 5.25 filed at 04/03/2025 1017   Can you participate in moderate recreational activities like golf, bowling, dancing, doubles tennis or throwing a baseball or football? 6 filed at 04/03/2025 1017   Can you participate in strenous sports like swimming, singles tennis, football, basketball, or skiing? 7.5 filed at 04/03/2025 1017   DASI SCORE 58.2 filed at 04/03/2025 1017   METS Score (Will be calculated only when all the questions are answered) 9.9 filed at 04/03/2025 1017          Caprini DVT Assessment    No data to display       Modified Frailty Index    No data to display       [id number] Stroke Risk Points  Current as of just now        N/A 0 to 9 Points:      Last Change: N/A          The [id number] risk score (Vivienne DEMPSEY, et al. 2009. © 2010 [employer]) quantifies the risk of stroke for a patient with atrial fibrillation. For patients  without atrial fibrillation or under the age of 18 this score appears as N/A. Higher score values generally indicate higher risk of stroke.        This score is not applicable to this patient. Components are not calculated.          Revised Cardiac Risk Index    No data to display       Apfel Simplified Score    No data to display       Risk Analysis Index Results This Encounter    No data found in the last 10 encounters.       Stop Bang Score      Flowsheet Row Pre-Admission Testing from 4/3/2025 in [facility]   Do you snore loudly? 1 filed at 04/03/2025 1017   Do you often feel tired or fatigued after your sleep? 1 filed at 04/03/2025 1017   Has anyone ever observed you stop breathing in your sleep? 0 filed at 04/03/2025 1017   Do you have or are you being treated for high blood pressure? 0 filed at 04/03/2025 1019   Recent BMI (Calculated) 24.7 filed at 04/03/2025 1017   Is BMI greater than 35 kg/m2? 0=No filed at 04/03/2025 1017   Age older than 50 years old? 1=Yes filed at 04/03/2025 1017   Is your neck circumference greater than 17 inches (Male) or 16 inches (Female)? 0 filed at 04/03/2025 1017   Gender - Male 1=Yes filed at 04/03/2025 1017   STOP-BANG Total Score 5 filed at 04/03/2025 1017          Prodigy: High Risk  Total Score: 19              Prodigy Age Score      Prodigy Gender Score     Prodigy Previous Opioid Use Score           ARISCAT Score for Postoperative Pulmonary Complications    No data to display       Finch Perioperative Risk for Myocardial Infarction or Cardiac Arrest (YOHAN)    No data to display         Assessment and Plan:     Anesthesia  The patient denies problems with anesthesia in the past such as PONV, prolonged sedation, awareness, dental damage, aspiration, cardiac arrest, difficult intubation, or unexpected hospital admissions.     Neurology  The patient has no neurological diagnoses or significant findings on chart review, clinical presentation, and evaluation.  No grossly apparent neurological perioperative risk. The patient is at increased risk for postoperative delirium secondary to sensory Impairment. The patient is at increased risk for perioperative stroke secondary to hyperlipidemia, general anesthesia, operative time >2.5 hours. Handouts for preoperative brain exercises given to patient.    HEENT/Airway  The patient has bilateral SNHL. No documented or reported history of airway difficulty.     Cardiovascular  The patient is scheduled for non-cardiac surgery associated with elevated risk. The patient has no major cardiac contraindications to non- cardiac surgery.  RCRI  The patient meets 0 RCRI criteria and therefor has a 3.9% risk of major adverse cardiac complications.  METS  The patient's functional capacity is greater than 4 METS.  EKG  The patient has no EKG or echocardiographic changes concerning for myocardial ischemia.   Heart Failure  The patient has no known history of heart failure.  Additionally, the patient reports no symptoms of heart failure and demonstrates no signs of heart failure.  Hypertension Evaluation  The patient has no known history of hypertension and has a normal blood pressure today.  Heart Rhythm Evaluation  The patient has no history of arrhythmias.  Heart Valve Evaluation  The patient has no known history of valvular heart disease. The patient has no symptoms or physical exam findings to suggest valvular heart disease.  Cardiology Evaluation  The patient is not followed by cardiology.    The patient has a 30-day risk for MACE of 0 predictors, 3.9% risk for cardiac death, nonfatal myocardial infarction, and nonfatal cardiac arrest.  YOHAN score which indicates a 0.1% risk of intraoperative or 30-day postoperative MACE (major adverse cardiac event).    Pulmonary  The patient has ? COPD and SOB. The patient is at increased risk of perioperative pulmonary complications secondary to ongoing tobacco abuse, advanced age greater than 60.    The  patient has a stop bang score of 5, which places patient at high risk for having CRISS.    ARISCAT 26, Intermediate, 13.3% risk of in-hospital postoperative pulmonary complications  PRODIGY 21, high risk of respiratory depression episode. Patient given PI sheet for preoperative deep breathing exercises.    Hematology  No diagnoses or significant findings on chart review or clinical presentation and evaluation.  Antiplatelet management   The patient is not currently receiving antiplatelet therapy.  Anticoagulation management  The patient is not currently receiving anticoagulation therapy.    Caprini score 8, high risk of perioperative VTE.     Patient instructed to ambulate as soon as possible postoperatively to decrease thromboembolic risk. Initiate mechanical DVT prophylaxis as soon as possible and initiate chemical prophylaxis when deemed safe from a bleeding standpoint post surgery.     Transfusion Evaluation  T&S not obtained. Low likelihood for perioperative transfusion of blood or blood products.    Gastrointestinal  No diagnoses or significant findings on chart review or clinical presentation and evaluation.    Eat 10- 0,  self-perceived oropharyngeal dysphagia scale (0-40)     Genitourinary  The patient has h/o nephrolithiasis    Renal  No renal diagnoses or significant findings on chart review or clinical presentation and evaluation. The patient has specific risk factors associated with increased risk of perioperative renal complications related to age greater than 55, male gender, COPD.    Musculoskeletal  No diagnoses or significant findings on chart review or clinical presentation and evaluation.    Endocrine  Diabetes Evaluation  The patient has no history of diabetes mellitus.  Thyroid Disease Evaluation  The patient has no history of thyroid disease.    ID  No diagnoses or significant findings on chart review or clinical presentation and evaluation. MRSA screening obtained. Prescriptions and  instructions given for Hibiclens and Peridex.    -Preoperative medication instructions were provided and reviewed with the patient.  Any additional testing or evaluation was explained to the patient.  NPO Instructions were discussed, and the patient's questions were answered prior to conclusion of this encounter. Patient verbalized understanding of preoperative instructions. After Visit Summary given.      Recent Results (from the past week)   Staphylococcus aureus/MRSA colonization, Culture    Collection Time: 04/03/25 11:14 AM    Specimen: Nares/Axilla/Groin; Swab   Result Value Ref Range    Staph/MRSA Screen Culture No Staphylococcus aureus isolated    CBC    Collection Time: 04/03/25 11:14 AM   Result Value Ref Range    WBC 8.7 4.4 - 11.3 x10*3/uL    nRBC 0.0 0.0 - 0.0 /100 WBCs    RBC 5.50 4.50 - 5.90 x10*6/uL    Hemoglobin 17.6 (H) 13.5 - 17.5 g/dL    Hematocrit 51.4 41.0 - 52.0 %    MCV 94 80 - 100 fL    MCH 32.0 26.0 - 34.0 pg    MCHC 34.2 32.0 - 36.0 g/dL    RDW 13.4 11.5 - 14.5 %    Platelets 316 150 - 450 x10*3/uL   Basic Metabolic Panel    Collection Time: 04/03/25 11:14 AM   Result Value Ref Range    Glucose 88 74 - 99 mg/dL    Sodium 140 136 - 145 mmol/L    Potassium 4.7 3.5 - 5.3 mmol/L    Chloride 103 98 - 107 mmol/L    Bicarbonate 27 21 - 32 mmol/L    Anion Gap 15 10 - 20 mmol/L    Urea Nitrogen 20 6 - 23 mg/dL    Creatinine 0.96 0.50 - 1.30 mg/dL    eGFR 89 >60 mL/min/1.73m*2    Calcium 10.0 8.6 - 10.6 mg/dL

## 2025-04-03 NOTE — PREPROCEDURE INSTRUCTIONS
Fasting Guidelines    NPO Instructions:    Do not eat any food after midnight the night before your surgery/procedure.  You may have up to Thirteen ounces of clear liquids until TWO hours before your instructed arrival time to the hospital. This includes water, black tea/coffee, (no milk or cream), apple juice, and/or electrolyte drinks (Gatorade).  You may chew gum up to TWO hours before your surgery/procedure.    Additional Instructions:    Avoid herbal supplements, multivitamins and NSAIDS (non-steroidal anti-inflammatory drugs) such as Advil, Aleve, Ibuprofen, Naproxen, Excedrin, Meloxicam or Celebrex for at least 7 days prior to surgery. May take Tylenol as needed.    Avoid tobacco and alcohol products for 24 hours prior to surgery.    CONTACT SURGEON'S OFFICE IF YOU DEVELOP:  * Fever = 100.4 F   * New respiratory symptoms (e.g. cough, shortness of breath, respiratory distress, sore throat)  * Recent loss of taste or smell  *Flu like symptoms such as headache, fatigue or gastrointestinal symptoms  * You develop any open sores, shingles, burning or painful urination   AND/OR:  * You no longer wish to have the surgery.  * Any other personal circumstances change that may lead to the need to cancel or defer this surgery.  *You were admitted to any hospital within one week of your planned procedure.    Seven/Six Days before Surgery:  Review your medication instructions, stop indicated medications    Day of Surgery:  Review your medication instructions, take indicated medications  Wear comfortable loose fitting clothing  Do not use moisturizers, creams, lotions or perfume  All jewelry and valuables should be left at home        [facility]  [phone]       Preoperative Brain Exercises    What are brain exercises?  A brain exercise is any activity that engages your thinking (cognitive) skills.    What types of activities are considered brain exercises?  Jikalyaniaw puzzles, crossword  puzzles, word jumble, memory games, word search, and many more.  Many can be found free online or on your phone via a mobile [place].    Why should I do brain exercises before my surgery?  More recent research has shown brain exercise before surgery can lower the risk of postoperative delirium (confusion) which can be especially important for older adults.  Patients who did brain exercises for 5 to 10 hours the days before surgery, cut their risk of postoperative delirium in half up to 1 week after surgery.         [facility]    Preoperative Deep Breathing Exercises    Why it is important to do deep breathing exercises before my surgery?  Deep breathing exercises strengthen your breathing muscles.  This helps you to recover after your surgery and decreases the chance of breathing complications.      How are the deep breathing exercises done?  Sit straight with your back supported.  Breathe in deeply and slowly through your nose. Your lower rib cage should expand and your abdomen may move forward.  Hold that breath for 3 to 5 seconds.  Breathe out through pursed lips, slowly and completely.  Rest and repeat 10 times every hour while awake.  Rest longer if you become dizzy or lightheaded.         Patient and Family Education             Ways You Can Help Prevent Blood Clots             This handout explains some simple things you can do to help prevent blood clots.      Blood clots are blockages that can form in the body's veins. When a blood clot forms in your deep veins, it may be called a deep vein thrombosis, or DVT for short. Blood clots can happen in any part of the body where blood flows, but they are most common in the arms and legs. If a piece of a blood clot breaks free and travels to the lungs, it is called a pulmonary embolus (PE). A PE can be a very serious problem.         Being in the hospital or having surgery can raise your chances of getting a blood clot because you may not be  well enough to move around as much as you normally do.         Ways you can help prevent blood clots in the hospital         Wearing SCDs. SCDs stands for Sequential Compression Devices.   SCDs are special sleeves that wrap around your legs  They attach to a pump that fills them with air to gently squeeze your legs every few minutes.   This helps return the blood in your legs to your heart.   SCDs should only be taken off when walking or bathing.   SCDs may not be comfortable, but they can help save your life.               Wearing compression stockings - if your doctor orders them. These special snug fitting stockings gently squeeze your legs to help blood flow.       Walking. Walking helps move the blood in your legs.   If your doctor says it is ok, try walking the halls at least   5 times a day. Ask us to help you get up, so you don't fall.      Taking any blood thinning medicines your doctor orders.        Page 1 of 2     [facility]; 3/23   Ways you can help prevent blood clots at home       Wearing compression stockings - if your doctor orders them. ? Walking - to help move the blood in your legs.       Taking any blood thinning medicines your doctor orders.      Signs of a blood clot or PE      Tell your doctor or nurse know right away if you have of the problems listed below.    If you are at home, seek medical care right away. Call 911 for chest pain or problems breathing.               Signs of a blood clot (DVT) - such as pain,  swelling, redness or warmth in your arm or leg      Signs of a pulmonary embolism (PE) - such as chest     pain or feeling short of breath

## 2025-04-04 LAB — STAPHYLOCOCCUS SPEC CULT: NORMAL

## 2025-04-12 DIAGNOSIS — M19.90 ARTHRITIS: ICD-10-CM

## 2025-04-14 RX ORDER — MELOXICAM 7.5 MG/1
7.5 TABLET ORAL DAILY
Qty: 30 TABLET | Refills: 5 | OUTPATIENT
Start: 2025-04-14

## 2025-04-15 ENCOUNTER — APPOINTMENT (OUTPATIENT)
Dept: OTOLARYNGOLOGY | Facility: CLINIC | Age: 65
End: 2025-04-15
Payer: COMMERCIAL

## 2025-04-18 ENCOUNTER — ANESTHESIA EVENT (OUTPATIENT)
Dept: OPERATING ROOM | Facility: HOSPITAL | Age: 65
End: 2025-04-18
Payer: COMMERCIAL

## 2025-04-21 ENCOUNTER — ANESTHESIA (OUTPATIENT)
Dept: OPERATING ROOM | Facility: HOSPITAL | Age: 65
End: 2025-04-21
Payer: COMMERCIAL

## 2025-04-21 ENCOUNTER — HOSPITAL ENCOUNTER (OUTPATIENT)
Facility: HOSPITAL | Age: 65
Setting detail: OUTPATIENT SURGERY
Discharge: HOME | End: 2025-04-21
Attending: OTOLARYNGOLOGY | Admitting: OTOLARYNGOLOGY
Payer: COMMERCIAL

## 2025-04-21 VITALS
OXYGEN SATURATION: 94 % | SYSTOLIC BLOOD PRESSURE: 117 MMHG | TEMPERATURE: 97.5 F | HEIGHT: 70 IN | HEART RATE: 84 BPM | RESPIRATION RATE: 14 BRPM | BODY MASS INDEX: 24.52 KG/M2 | WEIGHT: 171.3 LBS | DIASTOLIC BLOOD PRESSURE: 64 MMHG

## 2025-04-21 DIAGNOSIS — H90.3 SENSORINEURAL HEARING LOSS (SNHL) OF BOTH EARS: Primary | ICD-10-CM

## 2025-04-21 PROBLEM — J44.9 CHRONIC OBSTRUCTIVE PULMONARY DISEASE (MULTI): Status: ACTIVE | Noted: 2025-04-21

## 2025-04-21 PROBLEM — E78.5 HYPERLIPIDEMIA: Status: ACTIVE | Noted: 2025-04-21

## 2025-04-21 LAB
ABO GROUP (TYPE) IN BLOOD: NORMAL
ANTIBODY SCREEN: NORMAL
RH FACTOR (ANTIGEN D): NORMAL

## 2025-04-21 PROCEDURE — 7100000010 HC PHASE TWO TIME - EACH INCREMENTAL 1 MINUTE: Performed by: OTOLARYNGOLOGY

## 2025-04-21 PROCEDURE — 2500000004 HC RX 250 GENERAL PHARMACY W/ HCPCS (ALT 636 FOR OP/ED): Mod: JZ | Performed by: NURSE ANESTHETIST, CERTIFIED REGISTERED

## 2025-04-21 PROCEDURE — 36415 COLL VENOUS BLD VENIPUNCTURE: CPT | Performed by: ANESTHESIOLOGY

## 2025-04-21 PROCEDURE — 2720000007 HC OR 272 NO HCPCS: Performed by: OTOLARYNGOLOGY

## 2025-04-21 PROCEDURE — 2780000003 HC OR 278 NO HCPCS: Performed by: OTOLARYNGOLOGY

## 2025-04-21 PROCEDURE — 3700000002 HC GENERAL ANESTHESIA TIME - EACH INCREMENTAL 1 MINUTE: Performed by: OTOLARYNGOLOGY

## 2025-04-21 PROCEDURE — 3600000004 HC OR TIME - INITIAL BASE CHARGE - PROCEDURE LEVEL FOUR: Performed by: OTOLARYNGOLOGY

## 2025-04-21 PROCEDURE — 2500000004 HC RX 250 GENERAL PHARMACY W/ HCPCS (ALT 636 FOR OP/ED)

## 2025-04-21 PROCEDURE — 7100000002 HC RECOVERY ROOM TIME - EACH INCREMENTAL 1 MINUTE: Performed by: OTOLARYNGOLOGY

## 2025-04-21 PROCEDURE — 2500000005 HC RX 250 GENERAL PHARMACY W/O HCPCS: Performed by: OTOLARYNGOLOGY

## 2025-04-21 PROCEDURE — 2500000004 HC RX 250 GENERAL PHARMACY W/ HCPCS (ALT 636 FOR OP/ED): Mod: JZ

## 2025-04-21 PROCEDURE — 2500000005 HC RX 250 GENERAL PHARMACY W/O HCPCS

## 2025-04-21 PROCEDURE — 69930 IMPLANT COCHLEAR DEVICE: CPT | Performed by: OTOLARYNGOLOGY

## 2025-04-21 PROCEDURE — 7100000001 HC RECOVERY ROOM TIME - INITIAL BASE CHARGE: Performed by: OTOLARYNGOLOGY

## 2025-04-21 PROCEDURE — 7100000009 HC PHASE TWO TIME - INITIAL BASE CHARGE: Performed by: OTOLARYNGOLOGY

## 2025-04-21 PROCEDURE — A69930 PR IMPLANT COCHLEAR DEVICE: Performed by: ANESTHESIOLOGY

## 2025-04-21 PROCEDURE — 3700000001 HC GENERAL ANESTHESIA TIME - INITIAL BASE CHARGE: Performed by: OTOLARYNGOLOGY

## 2025-04-21 PROCEDURE — L8614 COCHLEAR DEVICE: HCPCS | Performed by: OTOLARYNGOLOGY

## 2025-04-21 PROCEDURE — A69930 PR IMPLANT COCHLEAR DEVICE

## 2025-04-21 PROCEDURE — 2500000004 HC RX 250 GENERAL PHARMACY W/ HCPCS (ALT 636 FOR OP/ED): Performed by: OTOLARYNGOLOGY

## 2025-04-21 PROCEDURE — 86901 BLOOD TYPING SEROLOGIC RH(D): CPT | Performed by: ANESTHESIOLOGY

## 2025-04-21 PROCEDURE — 3600000009 HC OR TIME - EACH INCREMENTAL 1 MINUTE - PROCEDURE LEVEL FOUR: Performed by: OTOLARYNGOLOGY

## 2025-04-21 DEVICE — COCHLEAR, NUCLEUS CI632, PROFILE PLUS W/SLIM MODIOLAR ELECTRODE: Type: IMPLANTABLE DEVICE | Site: EAR | Status: FUNCTIONAL

## 2025-04-21 RX ORDER — METOCLOPRAMIDE HYDROCHLORIDE 5 MG/ML
10 INJECTION INTRAMUSCULAR; INTRAVENOUS ONCE AS NEEDED
Status: DISCONTINUED | OUTPATIENT
Start: 2025-04-21 | End: 2025-04-21 | Stop reason: HOSPADM

## 2025-04-21 RX ORDER — GLYCOPYRROLATE 0.2 MG/ML
INJECTION INTRAMUSCULAR; INTRAVENOUS AS NEEDED
Status: DISCONTINUED | OUTPATIENT
Start: 2025-04-21 | End: 2025-04-21

## 2025-04-21 RX ORDER — PHENYLEPHRINE HCL IN 0.9% NACL 0.4MG/10ML
SYRINGE (ML) INTRAVENOUS AS NEEDED
Status: DISCONTINUED | OUTPATIENT
Start: 2025-04-21 | End: 2025-04-21

## 2025-04-21 RX ORDER — SODIUM CHLORIDE 0.9 G/100ML
INJECTION, SOLUTION IRRIGATION AS NEEDED
Status: DISCONTINUED | OUTPATIENT
Start: 2025-04-21 | End: 2025-04-21 | Stop reason: HOSPADM

## 2025-04-21 RX ORDER — FENTANYL CITRATE 50 UG/ML
50 INJECTION, SOLUTION INTRAMUSCULAR; INTRAVENOUS EVERY 5 MIN PRN
Status: DISCONTINUED | OUTPATIENT
Start: 2025-04-21 | End: 2025-04-21 | Stop reason: HOSPADM

## 2025-04-21 RX ORDER — ONDANSETRON HYDROCHLORIDE 2 MG/ML
INJECTION, SOLUTION INTRAVENOUS AS NEEDED
Status: DISCONTINUED | OUTPATIENT
Start: 2025-04-21 | End: 2025-04-21

## 2025-04-21 RX ORDER — CEFAZOLIN 1 G/1
INJECTION, POWDER, FOR SOLUTION INTRAVENOUS AS NEEDED
Status: DISCONTINUED | OUTPATIENT
Start: 2025-04-21 | End: 2025-04-21

## 2025-04-21 RX ORDER — LIDOCAINE HYDROCHLORIDE AND EPINEPHRINE 10; 10 UG/ML; MG/ML
INJECTION, SOLUTION INFILTRATION; PERINEURAL AS NEEDED
Status: DISCONTINUED | OUTPATIENT
Start: 2025-04-21 | End: 2025-04-21 | Stop reason: HOSPADM

## 2025-04-21 RX ORDER — ROCURONIUM BROMIDE 10 MG/ML
INJECTION, SOLUTION INTRAVENOUS AS NEEDED
Status: DISCONTINUED | OUTPATIENT
Start: 2025-04-21 | End: 2025-04-21

## 2025-04-21 RX ORDER — PROPOFOL 10 MG/ML
INJECTION, EMULSION INTRAVENOUS AS NEEDED
Status: DISCONTINUED | OUTPATIENT
Start: 2025-04-21 | End: 2025-04-21

## 2025-04-21 RX ORDER — DEXAMETHASONE SODIUM PHOSPHATE 10 MG/ML
INJECTION INTRAMUSCULAR; INTRAVENOUS AS NEEDED
Status: DISCONTINUED | OUTPATIENT
Start: 2025-04-21 | End: 2025-04-21 | Stop reason: HOSPADM

## 2025-04-21 RX ORDER — LIDOCAINE HCL/PF 100 MG/5ML
SYRINGE (ML) INTRAVENOUS AS NEEDED
Status: DISCONTINUED | OUTPATIENT
Start: 2025-04-21 | End: 2025-04-21

## 2025-04-21 RX ORDER — NORETHINDRONE AND ETHINYL ESTRADIOL 0.5-0.035
KIT ORAL AS NEEDED
Status: DISCONTINUED | OUTPATIENT
Start: 2025-04-21 | End: 2025-04-21

## 2025-04-21 RX ORDER — SENNOSIDES 8.6 MG/1
1 TABLET ORAL DAILY
Qty: 7 TABLET | Refills: 0 | Status: SHIPPED | OUTPATIENT
Start: 2025-04-21 | End: 2025-04-28

## 2025-04-21 RX ORDER — HYDROMORPHONE HYDROCHLORIDE 1 MG/ML
INJECTION, SOLUTION INTRAMUSCULAR; INTRAVENOUS; SUBCUTANEOUS AS NEEDED
Status: DISCONTINUED | OUTPATIENT
Start: 2025-04-21 | End: 2025-04-21

## 2025-04-21 RX ORDER — CEPHALEXIN 500 MG/1
500 CAPSULE ORAL 3 TIMES DAILY
Qty: 21 CAPSULE | Refills: 0 | Status: SHIPPED | OUTPATIENT
Start: 2025-04-21 | End: 2025-04-28

## 2025-04-21 RX ORDER — ACETAMINOPHEN 325 MG/1
650 TABLET ORAL EVERY 4 HOURS PRN
Status: DISCONTINUED | OUTPATIENT
Start: 2025-04-21 | End: 2025-04-21 | Stop reason: HOSPADM

## 2025-04-21 RX ORDER — SODIUM CHLORIDE, SODIUM LACTATE, POTASSIUM CHLORIDE, CALCIUM CHLORIDE 600; 310; 30; 20 MG/100ML; MG/100ML; MG/100ML; MG/100ML
INJECTION, SOLUTION INTRAVENOUS CONTINUOUS PRN
Status: DISCONTINUED | OUTPATIENT
Start: 2025-04-21 | End: 2025-04-21

## 2025-04-21 RX ORDER — FENTANYL CITRATE 50 UG/ML
INJECTION, SOLUTION INTRAMUSCULAR; INTRAVENOUS AS NEEDED
Status: DISCONTINUED | OUTPATIENT
Start: 2025-04-21 | End: 2025-04-21

## 2025-04-21 RX ORDER — ONDANSETRON HYDROCHLORIDE 2 MG/ML
4 INJECTION, SOLUTION INTRAVENOUS ONCE AS NEEDED
Status: DISCONTINUED | OUTPATIENT
Start: 2025-04-21 | End: 2025-04-21 | Stop reason: HOSPADM

## 2025-04-21 RX ORDER — SODIUM CHLORIDE, SODIUM LACTATE, POTASSIUM CHLORIDE, CALCIUM CHLORIDE 600; 310; 30; 20 MG/100ML; MG/100ML; MG/100ML; MG/100ML
100 INJECTION, SOLUTION INTRAVENOUS CONTINUOUS
Status: DISCONTINUED | OUTPATIENT
Start: 2025-04-21 | End: 2025-04-21 | Stop reason: HOSPADM

## 2025-04-21 RX ORDER — MIDAZOLAM HYDROCHLORIDE 1 MG/ML
INJECTION INTRAMUSCULAR; INTRAVENOUS AS NEEDED
Status: DISCONTINUED | OUTPATIENT
Start: 2025-04-21 | End: 2025-04-21

## 2025-04-21 RX ORDER — HYDROMORPHONE HYDROCHLORIDE 0.2 MG/ML
0.2 INJECTION INTRAMUSCULAR; INTRAVENOUS; SUBCUTANEOUS EVERY 5 MIN PRN
Status: DISCONTINUED | OUTPATIENT
Start: 2025-04-21 | End: 2025-04-21 | Stop reason: HOSPADM

## 2025-04-21 RX ORDER — OXYCODONE HYDROCHLORIDE 5 MG/1
5 TABLET ORAL EVERY 6 HOURS PRN
Qty: 28 TABLET | Refills: 0 | Status: SHIPPED | OUTPATIENT
Start: 2025-04-21 | End: 2025-04-28

## 2025-04-21 RX ORDER — LIDOCAINE HYDROCHLORIDE 10 MG/ML
0.1 INJECTION, SOLUTION INFILTRATION; PERINEURAL ONCE
Status: DISCONTINUED | OUTPATIENT
Start: 2025-04-21 | End: 2025-04-21 | Stop reason: HOSPADM

## 2025-04-21 RX ADMIN — REMIFENTANIL HYDROCHLORIDE 0.05 MCG/KG/MIN: 1 INJECTION, POWDER, LYOPHILIZED, FOR SOLUTION INTRAVENOUS at 12:17

## 2025-04-21 RX ADMIN — Medication 80 MCG: at 12:16

## 2025-04-21 RX ADMIN — HYDROMORPHONE HYDROCHLORIDE 0.2 MG: 1 INJECTION, SOLUTION INTRAMUSCULAR; INTRAVENOUS; SUBCUTANEOUS at 13:57

## 2025-04-21 RX ADMIN — ROCURONIUM 50 MG: 50 INJECTION, SOLUTION INTRAVENOUS at 12:00

## 2025-04-21 RX ADMIN — FENTANYL CITRATE 50 MCG: 50 INJECTION, SOLUTION INTRAMUSCULAR; INTRAVENOUS at 11:57

## 2025-04-21 RX ADMIN — FENTANYL CITRATE 50 MCG: 50 INJECTION, SOLUTION INTRAMUSCULAR; INTRAVENOUS at 11:59

## 2025-04-21 RX ADMIN — GLYCOPYRROLATE 0.2 MG: 0.2 INJECTION INTRAMUSCULAR; INTRAVENOUS at 12:36

## 2025-04-21 RX ADMIN — Medication 80 MCG: at 12:45

## 2025-04-21 RX ADMIN — MIDAZOLAM HYDROCHLORIDE 2 MG: 2 INJECTION, SOLUTION INTRAMUSCULAR; INTRAVENOUS at 11:48

## 2025-04-21 RX ADMIN — DEXAMETHASONE SODIUM PHOSPHATE 10 MG: 4 INJECTION INTRA-ARTICULAR; INTRALESIONAL; INTRAMUSCULAR; INTRAVENOUS; SOFT TISSUE at 11:55

## 2025-04-21 RX ADMIN — ONDANSETRON 4 MG: 2 INJECTION INTRAMUSCULAR; INTRAVENOUS at 13:44

## 2025-04-21 RX ADMIN — LIDOCAINE HYDROCHLORIDE 80 MG: 20 INJECTION, SOLUTION INTRAVENOUS at 11:59

## 2025-04-21 RX ADMIN — HYDROMORPHONE HYDROCHLORIDE 0.2 MG: 1 INJECTION, SOLUTION INTRAMUSCULAR; INTRAVENOUS; SUBCUTANEOUS at 14:05

## 2025-04-21 RX ADMIN — Medication 80 MCG: at 12:13

## 2025-04-21 RX ADMIN — EPHEDRINE SULFATE 10 MG: 50 INJECTION, SOLUTION INTRAVENOUS at 12:33

## 2025-04-21 RX ADMIN — EPHEDRINE SULFATE 10 MG: 50 INJECTION, SOLUTION INTRAVENOUS at 12:19

## 2025-04-21 RX ADMIN — Medication 40 MCG: at 13:15

## 2025-04-21 RX ADMIN — SUGAMMADEX 200 MG: 100 INJECTION, SOLUTION INTRAVENOUS at 14:06

## 2025-04-21 RX ADMIN — PROPOFOL 200 MG: 10 INJECTION, EMULSION INTRAVENOUS at 11:59

## 2025-04-21 RX ADMIN — HYDROMORPHONE HYDROCHLORIDE 0.2 MG: 1 INJECTION, SOLUTION INTRAMUSCULAR; INTRAVENOUS; SUBCUTANEOUS at 13:59

## 2025-04-21 RX ADMIN — HYDROMORPHONE HYDROCHLORIDE 0.4 MG: 1 INJECTION, SOLUTION INTRAMUSCULAR; INTRAVENOUS; SUBCUTANEOUS at 14:03

## 2025-04-21 RX ADMIN — CEFAZOLIN 2 G: 1 INJECTION, POWDER, FOR SOLUTION INTRAMUSCULAR; INTRAVENOUS at 11:54

## 2025-04-21 RX ADMIN — SODIUM CHLORIDE, POTASSIUM CHLORIDE, SODIUM LACTATE AND CALCIUM CHLORIDE: 600; 310; 30; 20 INJECTION, SOLUTION INTRAVENOUS at 11:48

## 2025-04-21 SDOH — HEALTH STABILITY: MENTAL HEALTH: CURRENT SMOKER: 1

## 2025-04-21 ASSESSMENT — COLUMBIA-SUICIDE SEVERITY RATING SCALE - C-SSRS
6. HAVE YOU EVER DONE ANYTHING, STARTED TO DO ANYTHING, OR PREPARED TO DO ANYTHING TO END YOUR LIFE?: NO
1. IN THE PAST MONTH, HAVE YOU WISHED YOU WERE DEAD OR WISHED YOU COULD GO TO SLEEP AND NOT WAKE UP?: NO
2. HAVE YOU ACTUALLY HAD ANY THOUGHTS OF KILLING YOURSELF?: NO

## 2025-04-21 ASSESSMENT — PAIN SCALES - GENERAL
PAINLEVEL_OUTOF10: 0 - NO PAIN
PAINLEVEL_OUTOF10: 0 - NO PAIN

## 2025-04-21 ASSESSMENT — PAIN - FUNCTIONAL ASSESSMENT
PAIN_FUNCTIONAL_ASSESSMENT: 0-10
PAIN_FUNCTIONAL_ASSESSMENT: 0-10

## 2025-04-21 NOTE — H&P
History Of Present Illness  Flavio Bahena is a 64 y.o. male presenting with sensorineural hearing loss.     Past Medical History  He has a past medical history of COPD (chronic obstructive pulmonary disease) (Multi), History of blood transfusion, HL (hearing loss), Hyperlipidemia, Nephrolithiasis, Shortness of breath, and Vision loss.    Surgical History  He has a past surgical history that includes Carpal tunnel release (Bilateral) and Wrist surgery.     Social History  He reports that he has been smoking cigarettes. He has never used smokeless tobacco. He reports current alcohol use of about 2.0 standard drinks of alcohol per week. He reports current drug use. Drug: Marijuana.    Family History  Family History[1]     Allergies  Patient has no known allergies.    Review of Systems     Physical Exam  Constitutional   General appearance: Healthy-appearing, well-nourished, well groomed, in no acute distress.   Ability to communicate: Normal communication without aids, normal voice quality.       Head and face: Atraumatic with no masses, lesions, or scarring.   Facial strength: Normal strength and symmetry, no synkinesis or facial tic.      Ears  Otoscopic examination: Bilateral normal otoscopy of the tympanic membrane     Nose: Dorsum symmetric with no visible or palpable deformities.     Oral Cavity/Mouth  Lips, teeth, and gums: Normal lips, gums, and dentition.     Oropharynx: Mucosa moist, no lesions.     Neck: Symmetrical, trachea midline. No masses visible.     Neurological/Psychiatric  Cranial Nerve Examination: II - XII grossly intact.  Orientation to person, place, and time: Normal.  Mood and affect: Normal.     Skin: Normal without rashes or lesions.     Pulmonary  Respiratory effort: Chest expands symmetrically.     Cardiovascular: Good peripheral pulses  Peripheral vascular system: No varicosities, carotid pulse normal, no edema. No jugular venous distension.     Extremities: Appearance of extremities:  Normal. Gait normal.      Last Recorded Vitals  There were no vitals taken for this visit.    Relevant Results  {If you would like to pull in Lab results for the last 24 hours, type .[id number] :99}  {If you would like to pull in Imaging results, type .imgrslt :99}    Assessment/Plan   Assessment & Plan  Sensorineural hearing loss (SNHL) of both ears      Right CI            Jannet Hansen MD         [1]   Family History  Problem Relation Name Age of Onset    Heart disease Mother      Leukemia Father

## 2025-04-21 NOTE — ANESTHESIA PROCEDURE NOTES
Airway  Date/Time: 4/21/2025 12:03 PM  Reason: elective    Airway not difficult    Staffing  Performed: SRNA   Authorized by: Julian Valdez MD    Performed by: AVA Viera-LEDA  Patient location during procedure: OR    Patient Condition  Indications for airway management: anesthesia  Patient position: sniffing  Sedation level: deep     Final Airway Details   Preoxygenated: yes  Final airway type: endotracheal airway  Successful airway: ETT  Cuffed: yes   Successful intubation technique: direct laryngoscopy  Adjuncts used in placement: intubating stylet  Endotracheal tube insertion site: oral  Blade: Christelle  Blade size: #4  ETT size (mm): 7.5  Cormack-Lehane Classification: grade I - full view of glottis  Placement verified by: capnometry   Measured from: lips  ETT to lips (cm): 23  Number of attempts at approach: 1    Additional Comments  Lips and teeth remain in preanesthetic condition. Head and neck positioned neutral following intubation.

## 2025-04-21 NOTE — OP NOTE
OPERATIVE NOTE     Date:  2025 OR Location: [facility] OR    Name: Flavio Bahena : 1960, Age: 64 y.o., MRN: [id number], Sex: male      Surgeons      Rubina Martinez MD    Resident/Fellow/Other Assistant:  Jannet Hansen MD    Anesthesia: General  ASA: II  Anesthesia Staff: Anesthesiologist: Julian Valdez MD  CRNA: AVA Garcia-CRNA  C-AA: ERICKA Mahajan  SRNA: AVA Viera-CRNA  Staff: Circulator: Suri Johnson RN  Relief Circulator: Margarita Kwon RN  Scrub Person: Emmanuelle Moore; Xochilt Flores RN; Suri Johnson RN         Preoperative Diagnosis:  1. Sensorineural Hearing Loss   - Bilateral.    Postoperative Diagnosis:  1. Sensorineural Hearing Loss   - Bilateral.      Procedure Performed:  1.  Cochlear Implantation (31617)   - Right  2. Needle electromyography; cranial nerve supplied muscle(s), unilateral   - Facial nerve.   - Right  3. Microsurgical techniques, requiring use of operating microscope   - Right      Indications:  Flavio Bahena is a very pleasant 64 y.o. male who presents with Bilateral severe-to-profound, sensorineural hearing loss and poor discrimination. The patient meets criteria for FDA insertion of cochlear implant. The risks, indications, and complications of surgery were discussed including, but not limited to facial nerve injury, deafness in the operated ear, vertigo, dizziness, imbalance, facial weakness or paralysis, change in sense of taste, perforation of eardrum, pain, bleeding, infection, scarring, need for further surgery, device failure, device extrusion, spinal fluid leak, meningitis, brain damage, brain abscess, stroke, and death. Informed consent was obtained. We also confirmed the patient received the Pneumococcus vaccine prior to surgery. Because of the extensive nature of the dissection and the close proximity of the facial nerve, facial nerve monitoring was used throughout the case.       Operative Findings:  1. Well aerated  mastoid.  2.  stimulator position: under temporalis muscle.  2. Facial nerve intact in normal position.  3. Chorda tympani: intact.   4. Approach: Round window  5. Implant/ electrode: Cochlear  placed.  6. Favorable anatomy.   7. Insertion:   - Very smooth full insertion.  - Speed:  30-60sec.  - Marker: at RW.   - No resistance.  8. Neural response telemetry: good response.  9. Intraop testing:   - X-ray: Not Performed.   - SmartNav position check: Normal.   - ECOG: Not performed.    - eSRT: Performed.      Operative Technique:   After informed consent was obtained, the patient was taken to the operating room and placed on the operating room table in the supine position. Anesthesia was induced by the anesthesia team without difficulty. Facial nerve monitoring electrodes were placed in the orbicularis oris muscle and orbicularis oculi muscle and the monitor was activated. Lidocaine with epinephrine was injected in the postauricular area of the incision and the patient was then prepped and draped in standard sterile fashion.    The postauricular incision was made down through the skin and soft tissue to the temporalis muscle and the ear was reflected forward in its avascular plane. Bovie cautery was then used to make a cut through musculofascial layers along the linea temporalis and this then was dissected down to the mastoid tip. Lempert periosteal elevator was used to elevate the soft tissues anteriorly, superiorly, and posteriorly, thus exposing the mastoid and the opening of the external auditory canal. We then used the Lempert elevator to elevate the periosteum off of the skull anteriorly for the ground electrode. The Lempert was then used posterior and superior to the mastoid to develop a tight pocket. Muscle from the temporalis muscle was then harvested for use later in the procedure. Dura hooks where used to hold our view.    A standard mastoidectomy was then completed, carefully identifying the  tegmen, sinodural angle and posterior canal wall. We identify the lateral semicircular canal and short process of the incus. Using 3-audie pacheco, and progressing down to a 2-audie pacheco we opened the facial recess. We carefully identified the chorda tympani, the incus buttress, and the facial nerve. The facial recess was opened exposing the round window and the rest of the middle ear. The mucosa over the round window niche was elevated using the right angle. The round window niche was identified and its lip was drilled to have a 360-degree view of it. Decadron was then injected into the middle ear.    A 2 mm cutting drill was used to created holes at the lateral edge of the mastoidectomy to secure the internal  in place. The ear was copiously irrigated with saline.  The cochlear implant was then brought onto the operative field and placed in our subperiosteal pocket.   Copious irrigation was then performed and the middle ear was again filled with Decadron. At this point, we performed the opening of the round window with a small right angle. Using microsuction and the jeweler's forceps, we inserted the cochlear implant electrode without resistance using off-sheath technique. The electrode was advanced slowly passed the marker on the array, and a pull-back technique was used. The insertion was very smooth. The insertion site was then packed with muscle, leaving the electrode lead inferiorly in the facial recess. The electrode was then carefully coiled back into the mastoid and a piece of Gelfoam was placed over the mastoid to protect the electrodes during closure. The middle ear was further injected with Decadron.    The wound was then closed in 3 layers using a 3-0 Vicryl for the muscle, 4-0 Vicryl for the subcuticular stitches and 5-0 fast gut suture for the dermis. During this time, the audiologist was here to induct neural response telemetry of the cochlear implant and got good responses. eSRT and  SmartNav were performed. At the conclusion of this procedure the incision was covered with a sterile dressing and the draping was removed. A [place] dressing was applied to the patient's ear. The patient's care was returned to anesthesia for awakening.    This completed the procedure. The patient was then emerged from anesthesia and extubated without difficulty. The patient was then transported back to PACU. There were no apparent complications. Following the procedure, the findings were discussed with the patient's family and all of their questions were answered.    Attending Attestation: I was present for the entire procedure.      Implants:   Implant Name Type Inv. Item Serial No.  Lot No. LRB No. Used Action   COCHLEAR, NUCLEUS , PROFILE PLUS W/SLIM MODIOLAR ELECTRODE - [id number] - [id number] ENT Implant COCHLEAR, NUCLEUS , PROFILE PLUS W/SLIM MODIOLAR ELECTRODE [id number] [employer]  Right 1 Implanted     Specimens: No specimens collected  Estimated Blood Loss: None  Complications: none  Condition of the patient: Stable  Disposition: PACU    ____________________________________________________  Rubina Martinez MD  Otology/Neurotology/Lateral Skull-Base Surgery   [facility]  Phone: [phone]  Fax: [phone]

## 2025-04-21 NOTE — ANESTHESIA PREPROCEDURE EVALUATION
Patient: Flavio Bahena    Procedure Information       Date/Time: 04/21/25 1010    Procedure: COHLEAR IMPLANT 632/622 AS BACK UP - SINGLE PROCESSOR (Right)    Location: [facility] OR  / [facility] OR    Surgeons: Rubina Martinez MD            Relevant Problems   Anesthesia (within normal limits)      Cardiac   (+) Hyperlipidemia      Pulmonary   (+) Asthma   (+) Chronic obstructive pulmonary disease (Multi)      Neuro (within normal limits)      GI (within normal limits)      /Renal (within normal limits)      Liver (within normal limits)      Endocrine (within normal limits)      Hematology (within normal limits)      Musculoskeletal (within normal limits)      HEENT   (+) Acute sinusitis   (+) Sensorineural hearing loss (SNHL) of both ears   (+) Sinusitis      ID (within normal limits)      Skin (within normal limits)       Clinical information reviewed:   Tobacco  Allergies  Meds   Med Hx  Surg Hx   Fam Hx  Soc Hx        NPO Detail:  NPO/Void Status  Carbohydrate Drink Given Prior to Surgery? : N  Date of Last Liquid: 04/21/25  Time of Last Liquid: 0000  Date of Last Solid: 04/20/25  Time of Last Solid: 2200  Last Intake Type: Clear fluids         Physical Exam    Airway  Mallampati: I  TM distance: >3 FB  Neck ROM: full  Mouth opening: 3 or more finger widths     Cardiovascular - normal exam   Dental   Comments: Missing teeth     Pulmonary - normal exam   Abdominal - normal exam           Anesthesia Plan    History of general anesthesia?: yes  History of complications of general anesthesia?: no    ASA 2     general     The patient is a current smoker.  Patient smoked on day of procedure.    intravenous induction   Anesthetic plan and risks discussed with patient.  Use of blood products discussed with patient who consented to blood products.

## 2025-04-21 NOTE — ANESTHESIA POSTPROCEDURE EVALUATION
Patient: Flavio Bahena    Procedure Summary       Date: 04/21/25 Room / Location: [facility] OR 05 / [facility] OR    Anesthesia Start: 1148 Anesthesia Stop: 1418    Procedure: COHLEAR IMPLANT 632/622 AS BACK UP - SINGLE PROCESSOR (Right: Ear) Diagnosis:       Sensorineural hearing loss (SNHL) of both ears      (Sensorineural hearing loss (SNHL) of both ears [H90.3])    Surgeons: Rubina Martinez MD Responsible Provider: Julian Valdez MD    Anesthesia Type: general ASA Status: 2            Anesthesia Type: general    Vitals Value Taken Time   /61 04/21/25 15:00   Temp 36.2 °C (97.2 °F) 04/21/25 14:55   Pulse 83 04/21/25 15:02   Resp 16 04/21/25 15:02   SpO2 100 % 04/21/25 15:02   Vitals shown include unfiled device data.    Anesthesia Post Evaluation    Patient location during evaluation: bedside  Patient participation: complete - patient participated  Level of consciousness: awake and alert  Pain management: adequate  Airway patency: patent  Cardiovascular status: acceptable  Respiratory status: acceptable  Hydration status: acceptable  Postoperative Nausea and Vomiting: none        There were no known notable events for this encounter.

## 2025-04-21 NOTE — PROGRESS NOTES
Cochlear Implant Intra-Operative Monitoring     Date: 04/21/25   Surgeon: Dr. Rubina Martinez MD  Audiology Extern: JEOVANY Chris, CCC-A     Implant Name Type Inv. Item Serial No.  Lot No. LRB No. Used Action   COCHLEAR, NUCLEUS , PROFILE PLUS W/SLIM MODIOLAR ELECTRODE - [id number] - [id number] ENT Implant COCHLEAR, NUCLEUS , PROFILE PLUS W/SLIM MODIOLAR ELECTRODE [id number] [employer]  Right 1 Implanted        Electrode Insertion Achieved: Full     Placement Check: Within normal limits    Impedances:  Impedances were measured on electrodes 1-22 and within normal limits.     eSRT: ESRTs were obtained using a pulse width of 37 and a stimulation rate of 900 Hz.  Responses were obtained at electrodes 1, 6, 12, 17 and 22.    NRTs: Completed on all electrodes, see scanned. (No Response obtained on electrode 21.    ECoG: Intracochlear electrocochleography was not clinically indicated.      Equipment and Forms:   The patient's equipment backpack was provided to the family. They were counseled to bring the backpack to activation. Post-operative course was reviewed with the family. The patient is instructed to stop using their hearing aid on the newly implanted ear. They are encouraged to continue using their hearing aid on the non-implanted ear.     The family was provided with the internal implant guide, MRI compatibility booklet, and implant identification card specific to the patient's implant. The patient should place this in their wallet. The family was counseled that the internal implant was registered; indicating the start of the 10 year 's warranty. The external equipment, located in the backpack provided, will be registered at the activation; indicating the start of the 5 year 's warranty on the processor.     The activation follow up schedule was reviewed and a completed form with the appointments was  provided. The auditory verbal therapy (AVT) form was provided to the family. The patient is guided to schedule AVT between the 2nd and 3rd activation by the Cochlear Implant team.     Post operative questions should be guided to the Patient Navigator Cochlear Implant Program, Pita Avitia RN at [phone].    Appointments  The patient is scheduled for follow up with Carlos Coleman at the [place] location.    Activation:   Date/Time : 5/8/2025 at 08:30 AM    2nd Stimulation:  Date/Time: 6/5/2025 at 10:00 AM    3rd Stimulation:  Date/Time: 7/24/2025 at 08:30 AM      Intraoperative testing was completed by CARLOS Chris CCC-A.

## 2025-04-22 ENCOUNTER — TELEPHONE (OUTPATIENT)
Dept: OTOLARYNGOLOGY | Facility: CLINIC | Age: 65
End: 2025-04-22
Payer: COMMERCIAL

## 2025-04-22 NOTE — TELEPHONE ENCOUNTER
Attempted to call patient at different numbers to check post operative course. Left direct office line on home phone number (no voicemail box set up on cell number) for additional questions prior to post operative appointment.

## 2025-04-24 DIAGNOSIS — H90.3 SENSORINEURAL HEARING LOSS (SNHL) OF BOTH EARS: ICD-10-CM

## 2025-04-28 ENCOUNTER — APPOINTMENT (OUTPATIENT)
Dept: AUDIOLOGY | Facility: CLINIC | Age: 65
End: 2025-04-28
Payer: COMMERCIAL

## 2025-05-01 NOTE — PROGRESS NOTES
History Of Present Illness:  Flavio Bahena is a 64 y.o. male with a history of bilateral SNHL, patient is s/p right CI on 4/21/25. The patient reports to be doing well postoperatively. He notes of difficulty sleeping due to a popping sensation in his ear. Otherwise has no postoperative complaints at this time. Scheduled for CI activation on 5/8/25.     Recall 1/21/25:  Flavio Bahena is a 64 y.o. male whom presents to me as a new patient for CI consultation, referred here today by audiology. The patient has a history of progressive hearing loss since [employer]. He does not currently utilizes hearing aids due to lack of perceived benefit. Endorses tinnitus and history of noise exposure. Denies otalgia, aural fullness, otorrhea, ear infections, ear surgeries, family history of hearing loss and dizziness/vertigo.      Surgical History:  He has a past surgical history that includes Carpal tunnel release (Bilateral) and Wrist surgery.     Allergies:  Patient has no known allergies.    Medications:   Current Outpatient Medications   Medication Instructions    albuterol 90 mcg/actuation inhaler INHALE 1 TO 2 PUFFS EVERY 4 TO 6 HOURS.    ascorbic acid (VITAMIN C) 250 mg, Daily    b complex 0.4 mg tablet 1 tablet, Daily    budesonide-glycopyr-formoterol (Breztri Aerosphere) 160-9-4.8 mcg/actuation HFA aerosol inhaler 2 puffs, inhalation, 2 times daily    cholecalciferol (VITAMIN D3) 1,000 Units, Daily    magnesium lactate CR (MAGTAB) 84 mg, Daily    magnesium, amino acid chelate, 133 mg tablet 1 tablet, Daily    rosuvastatin (CRESTOR) 10 mg, oral, Daily    tadalafil (CIALIS) 10 mg, oral, Daily PRN    traMADol (ULTRAM) 50 mg, Every 6 hours PRN      Review of Systems:   A comprehensive 10-point review of systems was obtained including constitutional, neurological, HEENT, pulmonary, cardiovascular, genito-urinary, and other pertinent systems and was negative except as noted in the HPI.      Physical  Exam:  Constitutional   General appearance: Healthy-appearing, well-nourished, well groomed, in no acute distress.   Ability to communicate: Normal communication without aids, normal voice quality.       Head and face: Atraumatic with no masses, lesions, or scarring.   Facial strength: Normal strength and symmetry, no synkinesis or facial tic.     Ears  Otoscopic examination:   Right: Postauricular incision is clean, dry, and intact, middle ear is clear, no hemotympanum present today, skin over the  is clean, dry, and intact.      Nose: Dorsum symmetric with no visible or palpable deformities.     Oral Cavity/Mouth  Lips, teeth, and gums: Normal lips, gums, and dentition.     Oropharynx: Mucosa moist, no lesions.     Neck: Symmetrical, trachea midline. No masses visible.     Neurological/Psychiatric  Cranial Nerve Examination: II - XII grossly intact.  Orientation to person, place, and time: Normal.  Mood and affect: Normal.     Skin: Normal without rashes or lesions.     Pulmonary  Respiratory effort: Chest expands symmetrically.     Cardiovascular: Good peripheral pulses  Peripheral vascular system: No varicosities, carotid pulse normal, no edema. No jugular venous distension.     Extremities: Appearance of extremities: Normal. Gait normal.     Last Recorded Vitals:  There were no vitals taken for this visit.     Assessment/Plan   64 y.o. male with a history of bilateral SNHL, patient is s/p right CI on 4/21/25. The patient reports to be doing well postoperatively. He notes of difficulty sleeping due to a popping sensation in his ear. Otherwise has no postoperative complaints at this time. I informed the patient the importance of wearing the CI as much as possible during the early stages. Patient verbalizes understanding and he will follow-up the week before his next CI surgery which is scheduled for 6/16/25.     - Patient may resume normal activities at this time  - Follow-up with audiology on 5/8/25 for  CI activation  - RTC the week before surgery for preoperative clearance     Scribe Attestation  By signing my name below, I, Silvia Garcias, Scribe   attest that this documentation has been prepared under the direction and in the presence of Rubina Martinez MD.     I have reviewed the documentation as scribed by Panda Garcias and agree with the notes.   Rubina Martinez MD

## 2025-05-06 ENCOUNTER — APPOINTMENT (OUTPATIENT)
Dept: OTOLARYNGOLOGY | Facility: CLINIC | Age: 65
End: 2025-05-06
Payer: COMMERCIAL

## 2025-05-06 VITALS — BODY MASS INDEX: 24.54 KG/M2 | WEIGHT: 171 LBS

## 2025-05-06 DIAGNOSIS — H90.3 SENSORINEURAL HEARING LOSS (SNHL) OF BOTH EARS: ICD-10-CM

## 2025-05-06 DIAGNOSIS — Z96.21 COCHLEAR IMPLANT IN PLACE: ICD-10-CM

## 2025-05-06 DIAGNOSIS — Z98.890 POSTOPERATIVE STATE: Primary | ICD-10-CM

## 2025-05-06 PROCEDURE — 99024 POSTOP FOLLOW-UP VISIT: CPT | Performed by: OTOLARYNGOLOGY

## 2025-05-06 NOTE — LETTER
May 6, 2025     Vj Miller DO  [address] [place] [id number]    Patient: Flavio Bahena   YOB: 1960   Date of Visit: 5/6/2025       Dear Dr. Vj Miller DO:    I had the pleasure of seeing your patient, Flavio Bahena today. Below are my notes for this consultation.  If you have questions, please do not hesitate to call me. Thank you for allowing me to participate in the care of your patient.        Sincerely,     Rubina Martinez MD      CC: No Recipients  _____________________________________________________________________________    64 y.o. male with a history of bilateral SNHL, patient is s/p right CI on 4/21/25. The patient reports to be doing well postoperatively. He notes of difficulty sleeping due to a popping sensation in his ear. Otherwise has no postoperative complaints at this time. I informed the patient the importance of wearing the CI as much as possible during the early stages. Patient verbalizes understanding and he will follow-up the week before his next CI surgery which is scheduled for 6/16/25.     - Patient may resume normal activities at this time  - Follow-up with audiology on 5/8/25 for CI activation  - RTC the week before surgery for preoperative clearance     Scribe Attestation  By signing my name below, I, Sierra Mclain   attest that this documentation has been prepared under the direction and in the presence of Rubina Martinez MD.     I have reviewed the documentation as scribed by Panda Garcias and agree with the notes.   Rubina Martinez MD

## 2025-05-08 ENCOUNTER — CLINICAL SUPPORT (OUTPATIENT)
Dept: AUDIOLOGY | Facility: CLINIC | Age: 65
End: 2025-05-08
Payer: COMMERCIAL

## 2025-05-08 DIAGNOSIS — H90.3 SENSORINEURAL HEARING LOSS (SNHL) OF BOTH EARS: ICD-10-CM

## 2025-05-08 PROCEDURE — 92603 COCHLEAR IMPLT F/UP EXAM 7/>: CPT | Performed by: SOCIAL WORKER

## 2025-05-08 NOTE — PROGRESS NOTES
Name: Flavio Bahena  YOB: 1960  Age: 64 y.o.    Date of Evaluation:  05/08/2025    Flavio Bahena, 64 y.o. , was seen for the initial activation of the Right Nucleus  cochlear implant to be used in conjunction with the [id number] speech processor. He was implanted by Dr. Martinez on 4/21/25. Post-operative course remains unremarkable.  He was accompanied by his daughter today    Recall: Flavio has a history of bilateral severe to profound sensorineural hearing loss with poor word understanding. He received no subjective benefit from hearing aids    Programming:  Impedances were evaluated using the N8 speech processor for intra-cochlear electrodes 1-22 in common ground (CG), Monopolar 1 (MP1), Monopolar 2 (MP2) and MP1+2 stimulation modes.  Satisfactory impedances were found for all electrodes, in each stimulation mode.      Magnet strength: 4(I)    The N8 processor and remote control were dispensed with all associated equipment and accessories.  The processor was programmed in an MP1+2 stimulation mode using an ACE speech processing strategy, 8 maxima and a 900 Hz stimulation rate.  Electrodes 1-22 were activated.  Using a population mean technique, a psychophysical map was created with good reliability.  C-levels were swept in bands of 3 for comfortable loudness. No facial nerve stimulation was observed.  Progressive maps were created and programmed with increments of 5 clinical units.    Flavio and his daughter  were oriented to the speech processor, remote assistant and accessories.  He/She was also given written and recorded instructional materials.  All equipment was registered electronically.  The Nucleus Smart Jani was downloaded on patient's smartphone and the processor was paired and connected.  Use of the Jani was reviewed today. Wireless accessories were paired and reviewed.    Overall, Flavio reports hearing voices and words. He is scheduled for a second stimulation on  6/2/25.    Patient Questions:  Did the patient meet with the [employer]  prior to surgery? YES  o   If the answer is yes:   Did the patient find this meeting helpful?YES  Would the patient recommend this meeting to a family member or friend who is considering a cochlear implant? YES  Was the patient more prepared for CI programming/activation due to meeting with the [employer] ? YES    Treatment Plan:  1. Utilize the Nucleus  Cochlear Implant System and the N8 speech processor daily moving through environmental programs P1-P4 as needed in conjunction with the ReSound hearing aid on the contralateral ear.   2. Return for remapping and optimization of the speech processor as scheduled in approximately 4 weeks in order to optimize the psychophysical ACE map.  3. Utilize aural rehabilitation/auditory training techniques at home to improve understanding ability. Recommend re-establish care with auditory verbal therapy resources through [facility]   4. ENT follow up annually    Time: 830-1000    Completed by:  Zaire Gonzalez CCC-A  Licensed Audiologist

## 2025-05-14 ENCOUNTER — TELEPHONE (OUTPATIENT)
Facility: CLINIC | Age: 65
End: 2025-05-14
Payer: COMMERCIAL

## 2025-05-14 DIAGNOSIS — E78.49 OTHER HYPERLIPIDEMIA: Primary | ICD-10-CM

## 2025-05-14 NOTE — TELEPHONE ENCOUNTER
Patient needs refill on   Meloxicam 7.5 mg   [facility]

## 2025-05-16 RX ORDER — ROSUVASTATIN CALCIUM 10 MG/1
10 TABLET, COATED ORAL DAILY
Qty: 90 TABLET | Refills: 1 | Status: SHIPPED | OUTPATIENT
Start: 2025-05-16

## 2025-05-17 DIAGNOSIS — M19.90 ARTHRITIS: ICD-10-CM

## 2025-05-17 RX ORDER — MELOXICAM 7.5 MG/1
7.5 TABLET ORAL DAILY
Qty: 90 TABLET | Refills: 0 | Status: SHIPPED | OUTPATIENT
Start: 2025-05-17 | End: 2025-08-15

## 2025-05-19 DIAGNOSIS — J44.9 CHRONIC OBSTRUCTIVE PULMONARY DISEASE, UNSPECIFIED COPD TYPE (MULTI): ICD-10-CM

## 2025-05-20 RX ORDER — BUDESONIDE, GLYCOPYRROLATE, AND FORMOTEROL FUMARATE 160; 9; 4.8 UG/1; UG/1; UG/1
2 AEROSOL, METERED RESPIRATORY (INHALATION) 2 TIMES DAILY
Qty: 10.7 G | Refills: 2 | Status: SHIPPED | OUTPATIENT
Start: 2025-05-20

## 2025-05-29 ENCOUNTER — APPOINTMENT (OUTPATIENT)
Dept: AUDIOLOGY | Facility: CLINIC | Age: 65
End: 2025-05-29
Payer: COMMERCIAL

## 2025-06-02 ENCOUNTER — CLINICAL SUPPORT (OUTPATIENT)
Dept: AUDIOLOGY | Facility: CLINIC | Age: 65
End: 2025-06-02
Payer: COMMERCIAL

## 2025-06-02 DIAGNOSIS — H90.3 SENSORINEURAL HEARING LOSS (SNHL) OF BOTH EARS: ICD-10-CM

## 2025-06-02 PROCEDURE — 92604 REPROGRAM COCHLEAR IMPLT 7/>: CPT | Performed by: SOCIAL WORKER

## 2025-06-02 NOTE — PROGRESS NOTES
History:  Flavio was seen on 6/2/25 for the 2nd stimulation and program optimization of his  Nucleus  cochlear implant used in conjunction with his Right  [id number] processor  He presents in Program 4  He denies any recent equipment issues but is experiencing some itching at the incision site  He reports hearing sounds and speech with the implant but that everyone sounds like Migdalia Mouse    Right implant surgery date: 4/21/25    Right activation: 5/8/25    Start/stop times: 835-940      Programming:  Electrode impedances were evaluated for all intra cochlear electrodes and found to be within normal limits  No short or open electrodes were detected  Electrode compliance levels were assessed and found to be satisfactory  Datalogging reveals an average of 11.8 hours of daily use  Headset/Coil magnet strength: 4(I)    Comfort levels were set to loud but comfortable across the array using a standard loudness ranking technique  Threshold levels were set using a standard approach    Comfort Levels were swept   No facial nerve stimulation was observed or reported    Right Map details: P1 = Psych Map 5, P2 = Cs + 5 map 6, P3 = Cs +10 map 7 and P4 = Cs + 15 map 8    He still has a Plus One to redeem but is undecided on what he will use it for.     Summary:  The N8 processor was updated with new psychophysical maps today.  He has a follow up aural rehabilitation appointment at the end of this month  He is scheduled for bilateral sequential cochlear implant surgery on 6/16/25  The Left initial activation is scheduled 7/7/25    Treatment Plan:  Consistent use of the right Nucleus  cochlear implant using the most comfortable program  Return in 7/24/25 for program optimization  Follow up with otology for medical management of cochlear implant  Pursue/continue speech services for aural rehabilitation/auditory training

## 2025-06-05 ENCOUNTER — APPOINTMENT (OUTPATIENT)
Facility: CLINIC | Age: 65
End: 2025-06-05
Payer: COMMERCIAL

## 2025-06-05 ENCOUNTER — APPOINTMENT (OUTPATIENT)
Dept: AUDIOLOGY | Facility: CLINIC | Age: 65
End: 2025-06-05
Payer: COMMERCIAL

## 2025-06-05 VITALS
WEIGHT: 171 LBS | SYSTOLIC BLOOD PRESSURE: 128 MMHG | OXYGEN SATURATION: 94 % | HEART RATE: 87 BPM | BODY MASS INDEX: 24.54 KG/M2 | DIASTOLIC BLOOD PRESSURE: 80 MMHG

## 2025-06-05 DIAGNOSIS — M19.90 ARTHRITIS: ICD-10-CM

## 2025-06-05 DIAGNOSIS — J44.9 CHRONIC OBSTRUCTIVE PULMONARY DISEASE, UNSPECIFIED COPD TYPE (MULTI): Primary | ICD-10-CM

## 2025-06-05 DIAGNOSIS — E78.49 OTHER HYPERLIPIDEMIA: ICD-10-CM

## 2025-06-05 DIAGNOSIS — Z13.220 SCREENING FOR LIPID DISORDERS: ICD-10-CM

## 2025-06-05 RX ORDER — ROSUVASTATIN CALCIUM 10 MG/1
10 TABLET, COATED ORAL DAILY
Qty: 90 TABLET | Refills: 3 | Status: SHIPPED | OUTPATIENT
Start: 2025-06-05

## 2025-06-05 RX ORDER — MELOXICAM 7.5 MG/1
7.5 TABLET ORAL DAILY
Qty: 90 TABLET | Refills: 0 | Status: SHIPPED | OUTPATIENT
Start: 2025-06-05 | End: 2025-06-05

## 2025-06-05 RX ORDER — BUDESONIDE, GLYCOPYRROLATE, AND FORMOTEROL FUMARATE 160; 9; 4.8 UG/1; UG/1; UG/1
2 AEROSOL, METERED RESPIRATORY (INHALATION) 2 TIMES DAILY
Qty: 10.7 G | Refills: 11 | Status: SHIPPED | OUTPATIENT
Start: 2025-06-05

## 2025-06-05 RX ORDER — MELOXICAM 7.5 MG/1
7.5 TABLET ORAL DAILY
Qty: 90 TABLET | Refills: 1 | Status: SHIPPED | OUTPATIENT
Start: 2025-06-05 | End: 2025-12-02

## 2025-06-05 NOTE — PROGRESS NOTES
Clinic Note: Family Medicine    Patient: Flavio Bahena  Encounter Date: 6/5/25  Subjective:  Chief Complaint   Patient presents with    Follow-up     History of Present Illness:  Flavio Bahena is a 64 y.o. male with past history of sensorineural hearing loss bilaterally s/p cochlear implant right, hyperlipidemia, asthma, COPD who presents for chronic medical condition for medication refill.     Neuropathy s/p bilateral carpal tunnel   - he sees neurologist   -  he reports taking tramadol 50 mg daily and meloxicam  7.5 mg daily.  Last creatinine was normal 4/2025.    Erectile dysfunction  - tadalafil 10mg as needed    Hyperlipidemia  - Rosuvastatin 10mg daily  - 4/2024  total cholesterol 170  The 10-year ASCVD risk score (Lucinda CASILLAS, et al., 2019) is: 13.5%    Values used to calculate the score:      Age: 64 years      Sex: Male      Is Non- : No      Diabetic: No      Tobacco smoker: Yes      Systolic Blood Pressure: 128 mmHg      Is BP treated: No      HDL Cholesterol: 48.9 mg/dL      Total Cholesterol: 170 mg/dL    COPD  - Breztri 2 puff, 2times daily. Doing well  - albuterol once every 2 weeks on average   - still vapes, counseled on cessation  - denies recent acute exacerbation  - 4/30/2024 CT lung screening revealed multiple subcentimeter nodules that was recommended for 6 months low-dose chest CT.  Patient reports financial barriers and will obtain them in the fall 2025.  - Offered pneumococcal vaccination today.  Patient declined.    Review of Systems:  See HPI    Past Medical History:   Medical History[1]    Surgical History[2]    Family History[3]    Social History     Socioeconomic History    Marital status:      Spouse name: Not on file    Number of children: Not on file    Years of education: Not on file    Highest education level: Not on file   Occupational History    Not on file   Tobacco Use    Smoking status: Every Day     Current packs/day: 0.50     Average  packs/day: 0.5 packs/day for 35.1 years (17.6 ttl pk-yrs)     Types: Cigarettes     Start date: 4/21/1990     Passive exposure: Never    Smokeless tobacco: Never   Vaping Use    Vaping status: Every Day    Substances: Nicotine    Devices: Refillable tank   Substance and Sexual Activity    Alcohol use: Yes     Alcohol/week: 2.0 standard drinks of alcohol     Types: 2 Cans of beer per week     Comment: social    Drug use: Yes     Types: Marijuana     Comment: gimmies    Sexual activity: Defer   Other Topics Concern    Not on file   Social History Narrative    Not on file     Social Drivers of Health     Financial Resource Strain: Not on file   Food Insecurity: Not on file   Transportation Needs: Not on file   Physical Activity: Not on file   Stress: Not on file   Social Connections: Not on file   Intimate Partner Violence: Not on file   Housing Stability: Not on file       Medications:  Patient's Medications   New Prescriptions    No medications on file   Previous Medications    ALBUTEROL 90 MCG/ACTUATION INHALER    INHALE 1 TO 2 PUFFS EVERY 4 TO 6 HOURS.    ASCORBIC ACID (VITAMIN C) 250 MG CHEWABLE TABLET    Chew 1 tablet (250 mg) early in the morning..    B COMPLEX 0.4 MG TABLET    Take 1 tablet by mouth once daily.    BUDESONIDE-GLYCOPYR-FORMOTEROL (BREZTRI AEROSPHERE) 160-9-4.8 MCG/ACTUATION HFA AEROSOL INHALER    Inhale 2 puffs 2 times a day.    CHOLECALCIFEROL (VITAMIN D3) 25 MCG (1000 UT) TABLET    Take 1 tablet (1,000 Units) by mouth once daily.    MAGNESIUM LACTATE CR (MAGTAB) 84 MG ER TABLET    Take 1 tablet (84 mg) by mouth once daily.    MAGNESIUM, AMINO ACID CHELATE, 133 MG TABLET    Take 1 tablet (133 mg) by mouth early in the morning..    MELOXICAM (MOBIC) 7.5 MG TABLET    Take 1 tablet (7.5 mg) by mouth once daily.    ROSUVASTATIN (CRESTOR) 10 MG TABLET    Take 1 tablet (10 mg) by mouth once daily.    TADALAFIL (CIALIS) 10 MG TABLET    Take 1 tablet (10 mg) by mouth once daily as needed for erectile  dysfunction.    TRAMADOL (ULTRAM) 50 MG TABLET    Take 1 tablet (50 mg) by mouth every 6 hours if needed.   Modified Medications    No medications on file   Discontinued Medications    No medications on file     Objective:    /80   Pulse 87   Wt 77.6 kg (171 lb)   SpO2 94%   BMI 24.54 kg/m²     Physical Exam  Constitutional:       General: He is not in acute distress.     Appearance: Normal appearance. He is not ill-appearing, toxic-appearing or diaphoretic.   Cardiovascular:      Rate and Rhythm: Normal rate and regular rhythm.      Heart sounds: No murmur heard.     No friction rub. No gallop.      Comments: Distant heard sounds  Pulmonary:      Effort: Pulmonary effort is normal. No respiratory distress.      Breath sounds: Normal breath sounds. No stridor. No wheezing, rhonchi or rales.      Comments: Decrease air movement  Neurological:      Mental Status: He is alert.       Results Reviewed:      Assessment and Plan:       1. Other hyperlipidemia (Primary)  4. Screening for lipid disorders  Doing well on Crestor 10 mg once daily.  Last labs 4/10/2024 cholesterol 170 and .  ASCVD score 13.5%.  In the future, consider uptitrating to 20 mg once daily for optimization.  - Lipid panel  - rosuvastatin (Crestor) 10 mg tablet; Take 1 tablet (10 mg) by mouth once daily.  Dispense: 90 tablet; Refill: 3    2. Arthritis  Patient reports taking meloxicam past 3 years.  Last creatinine 2/3/2025 was normal at 0.96.  Will provide 6 months refill with a given that he will obtain BMP every 6 months to ensure normal kidney function.  - meloxicam (Mobic) 7.5 mg tablet; Take 1 tablet (7.5 mg) by mouth once daily.  Dispense: 90 tablet; Refill: 0    3. Chronic obstructive pulmonary disease, unspecified COPD type (Multi)  Patient doing well on Breztri.  Reports that he has to do 2 puffs at once instead of 2 separate puffs.  Uses albuterol once every 2 weeks on average.  Denies any acute exacerbations.  Will continue  with Breztri and albuterol as needed.  Discussed his 4/2025 CT lung screening results which recommended a follow-up 6-month CT.  Patient reports financial barriers and that he will obtain in fall 2025.  AAA screening ultrasound 5/2025 was normal.  He declined pneumonia vaccination today.  - budesonide-glycopyr-formoterol (Breztri Aerosphere) 160-9-4.8 mcg/actuation HFA aerosol inhaler; Inhale 2 puffs 2 times a day.  Dispense: 10.7 g; Refill: 11    Return to clinic in 6 months follow up for annual physical exam with chronic conditions COPD, hyperlipidemia and meloxicam use. Patient understands and agrees to plan. All questions and concerns were addressed.    Thomas Pyle MD  Primary Care Sports Medicine Fellow  [facility]  [facility]       [1]   Past Medical History:  Diagnosis Date    COPD (chronic obstructive pulmonary disease) (Multi)     questionable    History of blood transfusion     childhood    HL (hearing loss)     Hyperlipidemia     Nephrolithiasis     Shortness of breath     with exertion    Vision loss     wears glasses   [2]   Past Surgical History:  Procedure Laterality Date    CARPAL TUNNEL RELEASE Bilateral     WRIST SURGERY     [3]   Family History  Problem Relation Name Age of Onset    Heart disease Mother      Leukemia Father

## 2025-06-12 ENCOUNTER — EVALUATION (OUTPATIENT)
Dept: SPEECH THERAPY | Facility: CLINIC | Age: 65
End: 2025-06-12
Payer: COMMERCIAL

## 2025-06-12 DIAGNOSIS — H90.3 SENSORINEURAL HEARING LOSS (SNHL) OF BOTH EARS: Primary | ICD-10-CM

## 2025-06-12 DIAGNOSIS — R48.8 OTHER SYMBOLIC DYSFUNCTIONS: ICD-10-CM

## 2025-06-12 PROCEDURE — 92523 SPEECH SOUND LANG COMPREHEN: CPT | Mod: GN

## 2025-06-12 PROCEDURE — 92507 TX SP LANG VOICE COMM INDIV: CPT | Mod: GN

## 2025-06-12 ASSESSMENT — PAIN SCALES - GENERAL: PAINLEVEL_OUTOF10: 0 - NO PAIN

## 2025-06-12 ASSESSMENT — PAIN - FUNCTIONAL ASSESSMENT: PAIN_FUNCTIONAL_ASSESSMENT: 0-10

## 2025-06-12 NOTE — PROGRESS NOTES
Speech-Language Pathology    Auditory Evaluation and Treatment      Patient Name: Flavio Bahena  MRN: [id number]  Today's Date: 6/12/2025     Time Calculation  Start Time: 0900  Stop Time: 1035  Time Calculation (min): 95 min  Time in evaluation: 60 min  Time in treatment: 35 min    Current Problem:  Problem List[1]     Impressions and Recommendations:   Patient with severe auditory skill deficits with new cochlear implant characterized by challenges with comprehending sentences and listening in noise.  Patient requires continued skilled intervention that is medically necessary and recommended by a physician to increase listening skills to promote overall communication.  Without this medically necessary service, the patient will not be able to achieve best outcomes with their surgical device (cochlear implant) and is anticipated to have negative impact on their involvement in the community and home communication.    Recommendations: Communication Therapy, Home Program    Long Term Goal: Comprehend sentences in competing noise with 80% accuracy in 6-12 months  Establish Date: 06/12/25     Short Term Goals:  Goal: Demonstrate assistive technology and home program therapy applications as evidence by personal report in 3 months.  Establish Date: 06/12/25  Status: Initiated  Progress: Provided education re: audiobooks, Hearing Success portal, Word Success [place], and Cochlear Auditory Rehab manual.     Goal: Identify consonants varying in place features in words with no visual cues with 80% accuracy in 3 months.   Establish Date: 06/12/25  Status: Initiated  Progress: Not targeted. Education provided re: [employer] [place].    Goal: Identify consonants varying in voicing features in words with no visual cues with 80% accuracy in 3 months.  Establish Date: 06/12/25  Status: Initiated  Progress: Not targeted. Education provided re: [employer] [place].    Goal: Comprehend sentences with no visual cues in a closed set of 6-8 with  80% accuracy in 3 months.  Establish Date: 06/12/25  Status: Initiated  Progress: Not targeted    Goal: Comprehend sentences about a picture with no visual cues with 80% accuracy in 6 months.  Establish Date: 06/12/25  Status: Initiated  Progress: Not targeted    Goal: Comprehend 1-2 paragraphs in quiet with no visual cues by recalling the story or answering WH questions with 80% accuracy in 6 months.   Establish Date: 06/12/25  Status: Initiated  Progress: Not targeted    Goal: Comprehend words in a known category from a closed set of 6-8 in noise of increasing complexity with no visual cues with 80% accuracy in 3 months.   Establish Date: 06/12/25  Status: Initiated  Progress: 0%, increased to 40% with a closed set of 6.    Resonance-Voice Assessment:  Assessments Used: Informal  Articulation Screening: Age appropriate  Nasal Resonance: Normal  Nasal Air Emissions: Not present  Voice: Normal  Speech Inteligibility: 100%       Auditory Plan of Care:  Recommend Treatment: Yes  Frequency: Other: (comment) (Every other week)  Duration: 6 months  Prognosis: Excellent  Factors Affecting Prognosis: None  Discussed POC: Patient  The patient's family/caregiver was educated regarding appropriate motivation and expectations for a cochlear implant (CI) and the CI process.: Yes      Subjective   Current Problem: Pt presents for aural rehabilitation evaluation. Reported he is wearing his implant for roughly 14 hours a day. He reported that he understands women's voices more easily than men's.     General Visit Information:  Recommendations: Communication Therapy, Home Program  Living Environment: Home  Language at home: Spoken English   Present: No  Arrival: Independent  Reason for Referral: Aural rehabilitation s/p cochlear implantation  Supervising clinician was present and made all clinical decisions.      Provider:  Audiology: Dr. Torres  ENT: Dr. Martinez    Pain:  Pain Assessment  Pain Assessment: 0-10  0-10  (Numeric) Pain Score: 0 - No pain    Objective     Baseline Observations:  Hearing Loss: Progressive (Over time)  Current Amplification: Worn during evaluation  Right Ear: Cochlear Implant  Date of amplification right: 5/8/2025  Amplification Worn During Evaluation: Yes  Hours Worn: Whenever awake       Cochlear [facility] Rehab Manual Screening & Exercise:  Sentence Length Identification : 70%  Phrase Length Identification : 100%  Paragraph Tracking : 70%    Common Phrase Test:  Common Phrase Test (CPT)  In Quiet : 10%  In Environmental Noise : 50%  In Competing Speech Noise : 0%     CAST - Recognition of:  Level 1: Suprasegmental Features : 18  Level 1: Presentations Correct : 90%  Level 2: Phonemically Dissimilar Words : 18  Level 2: Presentations Correct : 90%  Level 3: Vowels : 18  Level 3: Presentations Correct : 90%  Level 4: Consonant Manner Features : 19  Level 4: Presentations Correct : 95%  Level 5: Consonant Voicing Features : 13  Level 5: Presentations Correct : 65%  Level 6: Consonant Place Features : 14  Level 6: Presentations Correct : 70%  Level 7: Final Consonant Differences : 17  Level 7: Presentations Correct : 85%    Auditory Education:  Treatment Performed Today: Yes  Individual(s) Educated: Patient  Verbal Education Provided: Risks/Benefits of Therapy, Results of Testing, Technology Assistance  Written Education Provided: Exercises (Hearing Success ([facility] [employer] [place]), Audiobooks.)  Response to Educaton: Verbalized Understanding, Patient/caregiver asked appropriate questions  Patient's Learning Preference(s): Demonstration, Printed Materials, Explanation/Discussion  Patient/caregiver verbalized understanding and agreement.: Yes                  [1]   Patient Active Problem List  Diagnosis    Acute sinusitis    Sinusitis    Asthma    Bilateral impacted cerumen    Chronic pain of right knee    Sensorineural hearing loss (SNHL) of both ears    Chronic obstructive pulmonary disease (Multi)     Hyperlipidemia    Cochlear implant in place    Other symbolic dysfunctions

## 2025-06-15 NOTE — H&P
"Otolaryngology - Head and Neck Surgery History and Physical    History Of Present Illness  Flavio Bahena is a 64 y.o. male with bilateral SNHL, presenting for surgery to address this today. Since the last clinic visit, the patient has had no major changes in symptoms.     Past Medical History  He has a past medical history of COPD (chronic obstructive pulmonary disease) (Multi), History of blood transfusion, HL (hearing loss), Hyperlipidemia, Nephrolithiasis, Shortness of breath, and Vision loss.    Surgical History  He has a past surgical history that includes Carpal tunnel release (Bilateral) and Wrist surgery.     Social History  He reports that he has been smoking cigarettes. He started smoking about 35 years ago. He has a 17.6 pack-year smoking history. He has never been exposed to tobacco smoke. He has never used smokeless tobacco. He reports current alcohol use of about 2.0 standard drinks of alcohol per week. He reports current drug use. Drug: Marijuana.    Family History  Family History[1]     Allergies  Patient has no known allergies.    Review of Systems:  A 12-point review of systems was performed and noted be negative except for that which was mentioned in the history of present illness     Last Recorded Vitals  Blood pressure 154/76, pulse 60, temperature 36.5 °C (97.7 °F), resp. rate 16, height 1.778 m (5' 10\"), weight 77.7 kg (171 lb 3.2 oz), SpO2 97%.     Physical Exam:  Constitutional:  No acute distress  Voice:  No hoarseness or other abnormality  Respiration:  Breathing comfortably, no stridor  Eyes:  EOM intact grossly, sclera normal  Neuro:  Alert and oriented times 3, Cranial nerves II-XII grossly intact and symmetric bilaterally  Head and Face:  Symmetric facial features, no masses or lesions  Ears:  Normal external ear bilaterally, hearing adequate for verbal communication  Nose:  External nose normal in appearance, nares patent and without drainage  Oral Cavity/Oropharynx/Lips:  Normal " mucous membranes, no masses or lesions  Neck/Lymph:  No lesion or masses, trachea midline    Medications:  Scheduled medications  Scheduled Medications[2]  Continuous medications  Continuous Medications[3]  PRN medications  PRN Medications[4]    Recent Labs:  No results found for this or any previous visit (from the past 24 hours).    Imaging:   Preoperative audiogram and imaging were reviewed       Assessment/Plan   Flavio Bahena is a 64 y.o. male presenting with bilateral SNHL. The risks, indications, alternatives and complications of the proposed procedure were discussed with the patient. These include but are not limited to facial nerve injury, deafness in the operated ear, vertigo, dizziness, imbalance, facial weakness or paralysis, change in sense of taste, perforation of eardrum, pain, bleeding, infection, scarring, need for further surgery, recurrence, device extrusion, spinal fluid leak, meningitis, brain damage, intracranial hemorrhage, brain abscess, stroke, and death. The patient expressed understanding and all questions were answered. Consent was signed and saved to the patient chart.    Proceed with left cochlear implantation.    Panda Vera MD  Neurotology Fellow  Otolaryngology-Head & Neck Surgery         [1]   Family History  Problem Relation Name Age of Onset    Heart disease Mother      Leukemia Father     [2] [3] [4]

## 2025-06-16 ENCOUNTER — HOSPITAL ENCOUNTER (OUTPATIENT)
Facility: HOSPITAL | Age: 65
Setting detail: OUTPATIENT SURGERY
Discharge: HOME | End: 2025-06-16
Attending: OTOLARYNGOLOGY | Admitting: OTOLARYNGOLOGY
Payer: COMMERCIAL

## 2025-06-16 ENCOUNTER — ANESTHESIA EVENT (OUTPATIENT)
Dept: OPERATING ROOM | Facility: HOSPITAL | Age: 65
End: 2025-06-16
Payer: COMMERCIAL

## 2025-06-16 ENCOUNTER — ANESTHESIA (OUTPATIENT)
Dept: OPERATING ROOM | Facility: HOSPITAL | Age: 65
End: 2025-06-16
Payer: COMMERCIAL

## 2025-06-16 VITALS
HEART RATE: 63 BPM | RESPIRATION RATE: 16 BRPM | HEIGHT: 70 IN | BODY MASS INDEX: 24.51 KG/M2 | OXYGEN SATURATION: 92 % | DIASTOLIC BLOOD PRESSURE: 73 MMHG | TEMPERATURE: 98.1 F | SYSTOLIC BLOOD PRESSURE: 128 MMHG | WEIGHT: 171.2 LBS

## 2025-06-16 DIAGNOSIS — Z98.890 POSTOPERATIVE STATE: Primary | ICD-10-CM

## 2025-06-16 DIAGNOSIS — H90.3 SENSORINEURAL HEARING LOSS (SNHL) OF BOTH EARS: ICD-10-CM

## 2025-06-16 DIAGNOSIS — Z96.21 COCHLEAR IMPLANT IN PLACE: ICD-10-CM

## 2025-06-16 DIAGNOSIS — Z96.21 COCHLEAR IMPLANT IN PLACE: Primary | ICD-10-CM

## 2025-06-16 PROCEDURE — 2500000002 HC RX 250 W HCPCS SELF ADMINISTERED DRUGS (ALT 637 FOR MEDICARE OP, ALT 636 FOR OP/ED): Performed by: ANESTHESIOLOGY

## 2025-06-16 PROCEDURE — A69930 PR IMPLANT COCHLEAR DEVICE

## 2025-06-16 PROCEDURE — 2500000004 HC RX 250 GENERAL PHARMACY W/ HCPCS (ALT 636 FOR OP/ED)

## 2025-06-16 PROCEDURE — 2780000003 HC OR 278 NO HCPCS: Performed by: OTOLARYNGOLOGY

## 2025-06-16 PROCEDURE — 2500000001 HC RX 250 WO HCPCS SELF ADMINISTERED DRUGS (ALT 637 FOR MEDICARE OP): Performed by: ANESTHESIOLOGY

## 2025-06-16 PROCEDURE — 2500000004 HC RX 250 GENERAL PHARMACY W/ HCPCS (ALT 636 FOR OP/ED): Performed by: OTOLARYNGOLOGY

## 2025-06-16 PROCEDURE — 88304 TISSUE EXAM BY PATHOLOGIST: CPT | Mod: TC,SUR | Performed by: OTOLARYNGOLOGY

## 2025-06-16 PROCEDURE — 7100000002 HC RECOVERY ROOM TIME - EACH INCREMENTAL 1 MINUTE: Performed by: OTOLARYNGOLOGY

## 2025-06-16 PROCEDURE — 7100000009 HC PHASE TWO TIME - INITIAL BASE CHARGE: Performed by: OTOLARYNGOLOGY

## 2025-06-16 PROCEDURE — 3700000001 HC GENERAL ANESTHESIA TIME - INITIAL BASE CHARGE: Performed by: OTOLARYNGOLOGY

## 2025-06-16 PROCEDURE — A69930 PR IMPLANT COCHLEAR DEVICE: Performed by: ANESTHESIOLOGY

## 2025-06-16 PROCEDURE — 2500000004 HC RX 250 GENERAL PHARMACY W/ HCPCS (ALT 636 FOR OP/ED): Mod: JZ,TB | Performed by: ANESTHESIOLOGY

## 2025-06-16 PROCEDURE — P9045 ALBUMIN (HUMAN), 5%, 250 ML: HCPCS | Mod: JZ,TB

## 2025-06-16 PROCEDURE — 69930 IMPLANT COCHLEAR DEVICE: CPT | Performed by: OTOLARYNGOLOGY

## 2025-06-16 PROCEDURE — 2500000005 HC RX 250 GENERAL PHARMACY W/O HCPCS

## 2025-06-16 PROCEDURE — 7100000001 HC RECOVERY ROOM TIME - INITIAL BASE CHARGE: Performed by: OTOLARYNGOLOGY

## 2025-06-16 PROCEDURE — 2500000005 HC RX 250 GENERAL PHARMACY W/O HCPCS: Performed by: OTOLARYNGOLOGY

## 2025-06-16 PROCEDURE — 3600000009 HC OR TIME - EACH INCREMENTAL 1 MINUTE - PROCEDURE LEVEL FOUR: Performed by: OTOLARYNGOLOGY

## 2025-06-16 PROCEDURE — 96372 THER/PROPH/DIAG INJ SC/IM: CPT

## 2025-06-16 PROCEDURE — 3700000002 HC GENERAL ANESTHESIA TIME - EACH INCREMENTAL 1 MINUTE: Performed by: OTOLARYNGOLOGY

## 2025-06-16 PROCEDURE — L8690 AUD OSSEO DEV, INT/EXT COMP: HCPCS | Performed by: OTOLARYNGOLOGY

## 2025-06-16 PROCEDURE — 7100000010 HC PHASE TWO TIME - EACH INCREMENTAL 1 MINUTE: Performed by: OTOLARYNGOLOGY

## 2025-06-16 PROCEDURE — 2720000007 HC OR 272 NO HCPCS: Performed by: OTOLARYNGOLOGY

## 2025-06-16 PROCEDURE — L8614 COCHLEAR DEVICE: HCPCS | Performed by: OTOLARYNGOLOGY

## 2025-06-16 PROCEDURE — 3600000004 HC OR TIME - INITIAL BASE CHARGE - PROCEDURE LEVEL FOUR: Performed by: OTOLARYNGOLOGY

## 2025-06-16 DEVICE — COCHLEAR, NUCLEUS CI632, PROFILE PLUS W/SLIM MODIOLAR ELECTRODE: Type: IMPLANTABLE DEVICE | Site: EAR | Status: FUNCTIONAL

## 2025-06-16 DEVICE — PROCESSOR KIT, SINGLE N8: Type: IMPLANTABLE DEVICE | Site: EAR | Status: FUNCTIONAL

## 2025-06-16 RX ORDER — MUPIROCIN 20 MG/G
OINTMENT TOPICAL AS NEEDED
Status: DISCONTINUED | OUTPATIENT
Start: 2025-06-16 | End: 2025-06-16 | Stop reason: HOSPADM

## 2025-06-16 RX ORDER — CEPHALEXIN 500 MG/1
500 CAPSULE ORAL 3 TIMES DAILY
Qty: 21 CAPSULE | Refills: 0 | Status: SHIPPED | OUTPATIENT
Start: 2025-06-16 | End: 2025-06-23

## 2025-06-16 RX ORDER — ONDANSETRON HYDROCHLORIDE 2 MG/ML
INJECTION, SOLUTION INTRAVENOUS AS NEEDED
Status: DISCONTINUED | OUTPATIENT
Start: 2025-06-16 | End: 2025-06-16

## 2025-06-16 RX ORDER — LIDOCAINE HCL/PF 100 MG/5ML
SYRINGE (ML) INTRAVENOUS AS NEEDED
Status: DISCONTINUED | OUTPATIENT
Start: 2025-06-16 | End: 2025-06-16

## 2025-06-16 RX ORDER — PHENYLEPHRINE 10 MG/250 ML(40 MCG/ML)IN 0.9 % SOD.CHLORIDE INTRAVENOUS
CONTINUOUS PRN
Status: DISCONTINUED | OUTPATIENT
Start: 2025-06-16 | End: 2025-06-16

## 2025-06-16 RX ORDER — ASPIRIN 81 MG
100 TABLET, DELAYED RELEASE (ENTERIC COATED) ORAL 2 TIMES DAILY
Qty: 20 TABLET | Refills: 0 | Status: SHIPPED | OUTPATIENT
Start: 2025-06-16 | End: 2025-06-26

## 2025-06-16 RX ORDER — CEFAZOLIN 1 G/1
INJECTION, POWDER, FOR SOLUTION INTRAVENOUS AS NEEDED
Status: DISCONTINUED | OUTPATIENT
Start: 2025-06-16 | End: 2025-06-16

## 2025-06-16 RX ORDER — OXYCODONE HYDROCHLORIDE 5 MG/1
5 TABLET ORAL EVERY 4 HOURS PRN
Status: DISCONTINUED | OUTPATIENT
Start: 2025-06-16 | End: 2025-06-16 | Stop reason: HOSPADM

## 2025-06-16 RX ORDER — TRAMADOL HYDROCHLORIDE 50 MG/1
50 TABLET, FILM COATED ORAL EVERY 6 HOURS PRN
Qty: 10 TABLET | Refills: 0 | Status: SHIPPED | OUTPATIENT
Start: 2025-06-16 | End: 2025-06-16 | Stop reason: SINTOL

## 2025-06-16 RX ORDER — ROCURONIUM BROMIDE 10 MG/ML
INJECTION, SOLUTION INTRAVENOUS AS NEEDED
Status: DISCONTINUED | OUTPATIENT
Start: 2025-06-16 | End: 2025-06-16

## 2025-06-16 RX ORDER — OXYCODONE AND ACETAMINOPHEN 5; 325 MG/1; MG/1
1 TABLET ORAL EVERY 6 HOURS PRN
Qty: 5 TABLET | Refills: 0 | Status: SHIPPED | OUTPATIENT
Start: 2025-06-16 | End: 2025-06-23

## 2025-06-16 RX ORDER — MIDAZOLAM HYDROCHLORIDE 1 MG/ML
INJECTION INTRAMUSCULAR; INTRAVENOUS AS NEEDED
Status: DISCONTINUED | OUTPATIENT
Start: 2025-06-16 | End: 2025-06-16

## 2025-06-16 RX ORDER — LIDOCAINE HYDROCHLORIDE AND EPINEPHRINE 10; 10 UG/ML; MG/ML
INJECTION, SOLUTION INFILTRATION; PERINEURAL AS NEEDED
Status: DISCONTINUED | OUTPATIENT
Start: 2025-06-16 | End: 2025-06-16 | Stop reason: HOSPADM

## 2025-06-16 RX ORDER — PROPOFOL 10 MG/ML
INJECTION, EMULSION INTRAVENOUS AS NEEDED
Status: DISCONTINUED | OUTPATIENT
Start: 2025-06-16 | End: 2025-06-16

## 2025-06-16 RX ORDER — ALBUMIN HUMAN 50 G/1000ML
SOLUTION INTRAVENOUS AS NEEDED
Status: DISCONTINUED | OUTPATIENT
Start: 2025-06-16 | End: 2025-06-16

## 2025-06-16 RX ORDER — SODIUM CHLORIDE 0.9 G/100ML
INJECTION, SOLUTION IRRIGATION AS NEEDED
Status: DISCONTINUED | OUTPATIENT
Start: 2025-06-16 | End: 2025-06-16 | Stop reason: HOSPADM

## 2025-06-16 RX ORDER — SODIUM CHLORIDE, SODIUM LACTATE, POTASSIUM CHLORIDE, CALCIUM CHLORIDE 600; 310; 30; 20 MG/100ML; MG/100ML; MG/100ML; MG/100ML
100 INJECTION, SOLUTION INTRAVENOUS CONTINUOUS
Status: DISCONTINUED | OUTPATIENT
Start: 2025-06-16 | End: 2025-06-16 | Stop reason: HOSPADM

## 2025-06-16 RX ORDER — ONDANSETRON HYDROCHLORIDE 2 MG/ML
4 INJECTION, SOLUTION INTRAVENOUS ONCE AS NEEDED
Status: DISCONTINUED | OUTPATIENT
Start: 2025-06-16 | End: 2025-06-16 | Stop reason: HOSPADM

## 2025-06-16 RX ORDER — LABETALOL HYDROCHLORIDE 5 MG/ML
5 INJECTION, SOLUTION INTRAVENOUS ONCE AS NEEDED
Status: DISCONTINUED | OUTPATIENT
Start: 2025-06-16 | End: 2025-06-16 | Stop reason: HOSPADM

## 2025-06-16 RX ORDER — ALBUTEROL SULFATE 0.83 MG/ML
2.5 SOLUTION RESPIRATORY (INHALATION) ONCE
Status: COMPLETED | OUTPATIENT
Start: 2025-06-16 | End: 2025-06-16

## 2025-06-16 RX ORDER — GLYCOPYRROLATE 0.2 MG/ML
INJECTION INTRAMUSCULAR; INTRAVENOUS AS NEEDED
Status: DISCONTINUED | OUTPATIENT
Start: 2025-06-16 | End: 2025-06-16

## 2025-06-16 RX ORDER — ALBUTEROL SULFATE 0.83 MG/ML
2.5 SOLUTION RESPIRATORY (INHALATION) ONCE AS NEEDED
Status: DISCONTINUED | OUTPATIENT
Start: 2025-06-16 | End: 2025-06-16 | Stop reason: HOSPADM

## 2025-06-16 RX ORDER — PHENYLEPHRINE HCL IN 0.9% NACL 0.4MG/10ML
SYRINGE (ML) INTRAVENOUS AS NEEDED
Status: DISCONTINUED | OUTPATIENT
Start: 2025-06-16 | End: 2025-06-16

## 2025-06-16 RX ORDER — LIDOCAINE HYDROCHLORIDE 10 MG/ML
0.1 INJECTION, SOLUTION INFILTRATION; PERINEURAL ONCE
Status: DISCONTINUED | OUTPATIENT
Start: 2025-06-16 | End: 2025-06-16 | Stop reason: HOSPADM

## 2025-06-16 RX ORDER — HYDROMORPHONE HYDROCHLORIDE 0.2 MG/ML
0.2 INJECTION INTRAMUSCULAR; INTRAVENOUS; SUBCUTANEOUS EVERY 5 MIN PRN
Status: DISCONTINUED | OUTPATIENT
Start: 2025-06-16 | End: 2025-06-16 | Stop reason: HOSPADM

## 2025-06-16 RX ORDER — FENTANYL CITRATE 50 UG/ML
INJECTION, SOLUTION INTRAMUSCULAR; INTRAVENOUS AS NEEDED
Status: DISCONTINUED | OUTPATIENT
Start: 2025-06-16 | End: 2025-06-16

## 2025-06-16 RX ORDER — HYDROMORPHONE HYDROCHLORIDE 1 MG/ML
INJECTION, SOLUTION INTRAMUSCULAR; INTRAVENOUS; SUBCUTANEOUS AS NEEDED
Status: DISCONTINUED | OUTPATIENT
Start: 2025-06-16 | End: 2025-06-16

## 2025-06-16 RX ORDER — SUCCINYLCHOLINE CHLORIDE 20 MG/ML
INJECTION INTRAMUSCULAR; INTRAVENOUS AS NEEDED
Status: DISCONTINUED | OUTPATIENT
Start: 2025-06-16 | End: 2025-06-16

## 2025-06-16 RX ORDER — LIDOCAINE HYDROCHLORIDE 40 MG/ML
SOLUTION TOPICAL AS NEEDED
Status: DISCONTINUED | OUTPATIENT
Start: 2025-06-16 | End: 2025-06-16

## 2025-06-16 RX ADMIN — ALBUMIN HUMAN 250 ML: 0.05 INJECTION, SOLUTION INTRAVENOUS at 09:12

## 2025-06-16 RX ADMIN — DEXAMETHASONE SODIUM PHOSPHATE 10 MG: 4 INJECTION INTRA-ARTICULAR; INTRALESIONAL; INTRAMUSCULAR; INTRAVENOUS; SOFT TISSUE at 07:50

## 2025-06-16 RX ADMIN — CEFAZOLIN 2 G: 330 INJECTION, POWDER, FOR SOLUTION INTRAMUSCULAR; INTRAVENOUS at 08:00

## 2025-06-16 RX ADMIN — ROCURONIUM BROMIDE 10 MG: 10 INJECTION, SOLUTION INTRAVENOUS at 07:40

## 2025-06-16 RX ADMIN — ALBUTEROL SULFATE 2.5 MG: 2.5 SOLUTION RESPIRATORY (INHALATION) at 07:00

## 2025-06-16 RX ADMIN — SODIUM CHLORIDE, SODIUM LACTATE, POTASSIUM CHLORIDE, AND CALCIUM CHLORIDE: 600; 310; 30; 20 INJECTION, SOLUTION INTRAVENOUS at 07:34

## 2025-06-16 RX ADMIN — PHENYLEPHRINE-NACL IV SOLUTION 10 MG/250ML-0.9% 0.5 MCG/KG/MIN: 10-0.9/25 SOLUTION at 08:31

## 2025-06-16 RX ADMIN — Medication 100 MCG: at 08:01

## 2025-06-16 RX ADMIN — Medication 80 MCG: at 08:28

## 2025-06-16 RX ADMIN — LIDOCAINE HYDROCHLORIDE 4 ML: 40 SOLUTION TOPICAL at 07:42

## 2025-06-16 RX ADMIN — HYDROMORPHONE HYDROCHLORIDE 0.3 MG: 1 INJECTION, SOLUTION INTRAMUSCULAR; INTRAVENOUS; SUBCUTANEOUS at 10:50

## 2025-06-16 RX ADMIN — FENTANYL CITRATE 50 MCG: 50 INJECTION, SOLUTION INTRAMUSCULAR; INTRAVENOUS at 07:39

## 2025-06-16 RX ADMIN — Medication 200 MCG: at 08:04

## 2025-06-16 RX ADMIN — SUCCINYLCHOLINE CHLORIDE 140 MG: 20 INJECTION, SOLUTION INTRAMUSCULAR; INTRAVENOUS at 07:41

## 2025-06-16 RX ADMIN — Medication 160 MCG: at 08:10

## 2025-06-16 RX ADMIN — ONDANSETRON 4 MG: 2 INJECTION INTRAMUSCULAR; INTRAVENOUS at 10:30

## 2025-06-16 RX ADMIN — REMIFENTANIL HYDROCHLORIDE 0.05 MCG/KG/MIN: 1 INJECTION, POWDER, LYOPHILIZED, FOR SOLUTION INTRAVENOUS at 07:52

## 2025-06-16 RX ADMIN — Medication 160 MCG: at 08:25

## 2025-06-16 RX ADMIN — GLYCOPYRROLATE 0.2 MG: 0.2 SOLUTION INTRAMUSCULAR; INTRAVENOUS at 08:10

## 2025-06-16 RX ADMIN — PROPOFOL 150 MG: 10 INJECTION, EMULSION INTRAVENOUS at 07:40

## 2025-06-16 RX ADMIN — HYDROMORPHONE HYDROCHLORIDE 0.4 MG: 1 INJECTION, SOLUTION INTRAMUSCULAR; INTRAVENOUS; SUBCUTANEOUS at 10:44

## 2025-06-16 RX ADMIN — Medication 25 MG: at 08:14

## 2025-06-16 RX ADMIN — HYDROMORPHONE HYDROCHLORIDE 0.2 MG: 0.2 INJECTION, SOLUTION INTRAMUSCULAR; INTRAVENOUS; SUBCUTANEOUS at 11:02

## 2025-06-16 RX ADMIN — Medication 5 MG: at 09:12

## 2025-06-16 RX ADMIN — Medication 100 MCG: at 08:08

## 2025-06-16 RX ADMIN — OXYCODONE 5 MG: 5 TABLET ORAL at 11:22

## 2025-06-16 RX ADMIN — GLYCOPYRROLATE 0.2 MG: 0.2 SOLUTION INTRAMUSCULAR; INTRAVENOUS at 07:38

## 2025-06-16 RX ADMIN — MIDAZOLAM HYDROCHLORIDE 2 MG: 2 INJECTION, SOLUTION INTRAMUSCULAR; INTRAVENOUS at 07:26

## 2025-06-16 RX ADMIN — HYDROMORPHONE HYDROCHLORIDE 0.3 MG: 1 INJECTION, SOLUTION INTRAMUSCULAR; INTRAVENOUS; SUBCUTANEOUS at 10:39

## 2025-06-16 RX ADMIN — LIDOCAINE HYDROCHLORIDE 50 MG: 20 INJECTION INTRAVENOUS at 07:39

## 2025-06-16 SDOH — HEALTH STABILITY: MENTAL HEALTH: CURRENT SMOKER: 1

## 2025-06-16 ASSESSMENT — PAIN - FUNCTIONAL ASSESSMENT
PAIN_FUNCTIONAL_ASSESSMENT: 0-10

## 2025-06-16 ASSESSMENT — COLUMBIA-SUICIDE SEVERITY RATING SCALE - C-SSRS
1. IN THE PAST MONTH, HAVE YOU WISHED YOU WERE DEAD OR WISHED YOU COULD GO TO SLEEP AND NOT WAKE UP?: NO
2. HAVE YOU ACTUALLY HAD ANY THOUGHTS OF KILLING YOURSELF?: NO
6. HAVE YOU EVER DONE ANYTHING, STARTED TO DO ANYTHING, OR PREPARED TO DO ANYTHING TO END YOUR LIFE?: NO

## 2025-06-16 ASSESSMENT — PAIN SCALES - GENERAL
PAINLEVEL_OUTOF10: 0 - NO PAIN
PAINLEVEL_OUTOF10: 5 - MODERATE PAIN
PAINLEVEL_OUTOF10: 4
PAINLEVEL_OUTOF10: 5 - MODERATE PAIN
PAINLEVEL_OUTOF10: 5 - MODERATE PAIN
PAINLEVEL_OUTOF10: 4
PAINLEVEL_OUTOF10: 4
PAINLEVEL_OUTOF10: 5 - MODERATE PAIN

## 2025-06-16 NOTE — ANESTHESIA PROCEDURE NOTES
Airway  Date/Time: 6/16/2025 7:42 AM  Reason: elective    Airway not difficult    Staffing  Performed: CRNA   Authorized by: Lor Morales MD    Performed by: ARSLAN Norris  Patient location during procedure: OR    Patient Condition  Indications for airway management: anesthesia and airway protection  Patient position: sniffing  Planned trial extubation  Sedation level: deep     Final Airway Details   Preoxygenated: yes  Final airway type: endotracheal airway  Successful airway: ETT  Cuffed: yes   Successful intubation technique: direct laryngoscopy  Adjuncts used in placement: intubating stylet and anterior pressure/BURP  Endotracheal tube insertion site: oral  Blade: Christelle  Blade size: #4  ETT size (mm): 7.5  Cormack-Lehane Classification: grade IIb - view of arytenoids or posterior of glottis only  Placement verified by: chest auscultation and capnometry   Measured from: lips  ETT to lips (cm): 22  Ventilation between attempts: none  Number of attempts at approach: 1  Number of other approaches attempted: 0

## 2025-06-16 NOTE — PROGRESS NOTES
Cochlear Implant Intra-Operative Monitoring     Date: 06/16/25   Surgeon: Dr. Rubina Martinez MD  Audiology Extern: Mak Bingham         Implant Name Type Inv. Item Serial No.  Lot No. LRB No. Used Action   COCHLEAR, NUCLEUS , PROFILE PLUS W/SLIM MODIOLAR ELECTRODE - [id number] - [id number] ENT Implant COCHLEAR, NUCLEUS , PROFILE PLUS W/SLIM MODIOLAR ELECTRODE [id number] [employer]LE[employer]  Left 1 Implanted        Electrode Insertion Achieved: Full     Placement Check: Within normal limits    Impedances:  Impedances were measured on electrodes E1-22 and within normal limits.    eSRT: ESRTs were obtained using a pulse width of 37 and a stimulation rate of 900 Hz.  Responses were obtained at electrodes E1,6,12,17, and 22.    NRTs: Completed on all electrodes, see scanned.    ECoG: Intracochlear electrocochleography was not clinically indicated.           Equipment and Forms:   The patient's equipment backpack was provided to the family. They were counseled to bring the backpack to activation. Post-operative course was reviewed with the family. The patient is instructed to stop using their hearing aid on the newly implanted ear. They are encouraged to continue using their hearing aid on the non-implanted ear.     The family was provided with the internal implant guide, MRI compatibility booklet, and implant identification card specific to the patient's implant. The patient should place this in their wallet. The family was counseled that the internal implant was registered; indicating the start of the 10 year 's warranty. The external equipment, located in the backpack provided, will be registered at the activation; indicating the start of the 5 year 's warranty on the processor.     The activation follow up schedule was reviewed and a completed form with the appointments was provided. The auditory verbal therapy (AVT)  form was provided to the family. The patient is guided to schedule AVT between the 2nd and 3rd activation by the Cochlear Implant team.     Post operative questions should be guided to the Patient Navigator Cochlear Implant Program, Pita Avitia RN at [phone].      Appointments  The patient is scheduled for follow up with Dr. Zaire Torres, AuD CCC-A at the [place] location.    Activation:   Date/Time : 7/7/2025 at 9:30 am.    2nd Stimulation:  Date/Time: 7/24/2025 at 8:30 am.    3rd Stimulation:  Date/Time: 8/4/2025 at 8:30 am.      Intraoperative testing was completed by Mak Bingham. Reviewed by Roula Ramirez, MAGDALENE-A        Mak Bingham

## 2025-06-16 NOTE — ANESTHESIA PREPROCEDURE EVALUATION
Patient: Flavio Bahena    Procedure Information       Date/Time: 06/16/25 0645    Procedure: COCHLEAR IMPLANT (Left)    Location: [facility] OR 05 / [facility] OR    Surgeons: Rubina Martinez MD          64 y.o. male with past history of sensorineural hearing loss bilaterally s/p cochlear implant right, hyperlipidemia, asthma, COPD .  Here today for Left cochlear implant.     + tobacco use: 40+ yrs  Relevant Problems   Cardiac   (+) Hyperlipidemia      Pulmonary   (+) Asthma   (+) Chronic obstructive pulmonary disease (Multi)      HEENT   (+) Acute sinusitis   (+) Sensorineural hearing loss (SNHL) of both ears   (+) Sinusitis       Clinical information reviewed:    Allergies                NPO Detail:  NPO/Void Status  Carbohydrate Drink Given Prior to Surgery? : N  Date of Last Liquid: 06/15/25  Time of Last Liquid: 2300  Date of Last Solid: 06/15/25  Time of Last Solid: 2300  Last Intake Type: Clear fluids  Time of Last Void: 0500         Vitals:    06/16/25 0605   BP: 154/76   Pulse: 60   Resp: 16   Temp: 36.5 °C (97.7 °F)   SpO2: 97%       Surgical History[1]  Medical History[2]  Current Medications[3]  RX Allergies[4]  Social History     Tobacco Use    Smoking status: Every Day     Current packs/day: 0.50     Average packs/day: 0.5 packs/day for 35.2 years (17.6 ttl pk-yrs)     Types: Cigarettes     Start date: 4/21/1990     Passive exposure: Never    Smokeless tobacco: Never   Substance Use Topics    Alcohol use: Yes     Alcohol/week: 2.0 standard drinks of alcohol     Types: 2 Cans of beer per week     Comment: social         Chemistry    Lab Results   Component Value Date/Time     04/03/2025 1114    K 4.7 04/03/2025 1114     04/03/2025 1114    CO2 27 04/03/2025 1114    BUN 20 04/03/2025 1114    CREATININE 0.96 04/03/2025 1114    Lab Results   Component Value Date/Time    CALCIUM 10.0 04/03/2025 1114    ALKPHOS 67 09/29/2024 2154    AST 25 09/29/2024 2154    ALT 38 09/29/2024 2154     BILITOT 0.4 09/29/2024 2154          Lab Results   Component Value Date/Time    WBC 8.7 04/03/2025 1114    HGB 17.6 (H) 04/03/2025 1114    HCT 51.4 04/03/2025 1114     04/03/2025 1114           NPO Detail:  NPO/Void Status  Carbohydrate Drink Given Prior to Surgery? : N  Date of Last Liquid: 06/15/25  Time of Last Liquid: 2300  Date of Last Solid: 06/15/25  Time of Last Solid: 2300  Last Intake Type: Clear fluids  Time of Last Void: 0500         Review of Systems    Physical Exam    Airway  Mallampati: II  TM distance: >3 FB     Cardiovascular   Rhythm: regular     Dental     Comments: Poor dentition, some missing teeth     Pulmonary (+) wheezes     Abdominal - normal exam           Anesthesia Plan    History of general anesthesia?: yes  History of complications of general anesthesia?: no    ASA 3     general   (Preob neb albuterol tx  )  The patient is a current smoker.  Instructed to abstain from smoking on day of procedure: unknown.  Patient did not smoke on day of procedure.    intravenous induction   Postoperative pain plan includes opioids.  Trial extubation is planned.  Anesthetic plan and risks discussed with patient (and daughter at bedside).    Plan discussed with CRNA, attending and CAA.           [1]   Past Surgical History:  Procedure Laterality Date    CARPAL TUNNEL RELEASE Bilateral     WRIST SURGERY     [2]   Past Medical History:  Diagnosis Date    COPD (chronic obstructive pulmonary disease) (Multi)     questionable    History of blood transfusion     childhood    HL (hearing loss)     Hyperlipidemia     Nephrolithiasis     Shortness of breath     with exertion    Vision loss     wears glasses   [3] No current facility-administered medications for this encounter.  [4] No Known Allergies

## 2025-06-16 NOTE — OP NOTE
OPERATIVE NOTE     Date:  2025 OR Location: [facility] OR    Name: Flavio Bahena : 1960, Age: 64 y.o., MRN: [id number], Sex: male      Surgeons      Rubina Martinez MD    Resident/Fellow/Other Assistant:  Panda Vera MD    Anesthesia: General  ASA: III  Anesthesia Staff: Anesthesiologist: Lor Morales MD  CRNA: AVA Norris-CRNA  Staff: Circulator: Xin Diaz RN  Scrub Person: Nelson Mcclure         Preoperative Diagnosis:  1. Sensorineural Hearing Loss   - Bilateral.    Postoperative Diagnosis:  1. Sensorineural Hearing Loss   - Bilateral.      Procedure Performed:  1.  Cochlear Implantation (57361)   - Left  2. Needle electromyography; cranial nerve supplied muscle(s), unilateral   - Facial nerve.   - Left  3. Microsurgical techniques, requiring use of operating microscope   - Left      Indications:  Flavio Bahena is a very pleasant 64 y.o. male who presents with Bilateral severe-to-profound, sensorineural hearing loss and poor discrimination. The patient meets criteria for FDA insertion of cochlear implant. The risks, indications, and complications of surgery were discussed including, but not limited to facial nerve injury, deafness in the operated ear, vertigo, dizziness, imbalance, facial weakness or paralysis, change in sense of taste, perforation of eardrum, pain, bleeding, infection, scarring, need for further surgery, device failure, device extrusion, spinal fluid leak, meningitis, brain damage, brain abscess, stroke, and death. Informed consent was obtained. We also confirmed the patient received the Pneumococcus vaccine prior to surgery. Because of the extensive nature of the dissection and the close proximity of the facial nerve, facial nerve monitoring was used throughout the case.       Operative Findings:  1. Well aerated mastoid.  2.  stimulator position: under temporalis muscle.  2. Facial nerve intact in normal position.  3. Chorda  tympani: intact.   4. Approach: Extended round window  5. Implant/ electrode: Cochlear  placed.  6. Favorable anatomy.   7. Insertion:   - Very smooth full insertion.  - Speed:  <30sec.  - Marker: 3rd ring at the membrane.   - No resistance.  8. Neural response telemetry: good response.  9. Intraop testing:   - X-ray: Not Performed.   - SmartNav position check: Normal.   - ECOG: Not performed.    - eSRT: Performed.      Operative Technique:   After informed consent was obtained, the patient was taken to the operating room and placed on the operating room table in the supine position. Anesthesia was induced by the anesthesia team without difficulty. Facial nerve monitoring electrodes were placed in the orbicularis oris muscle and orbicularis oculi muscle and the monitor was activated. Lidocaine with epinephrine was injected in the postauricular area of the incision and the patient was then prepped and draped in standard sterile fashion.    The postauricular incision was made down through the skin and soft tissue to the temporalis muscle and the ear was reflected forward in its avascular plane. Bovie cautery was then used to make a cut through musculofascial layers along the linea temporalis and this then was dissected down to the mastoid tip. Lempert periosteal elevator was used to elevate the soft tissues anteriorly, superiorly, and posteriorly, thus exposing the mastoid and the opening of the external auditory canal. We then used the Lempert elevator to elevate the periosteum off of the skull anteriorly for the ground electrode. The Lempert was then used posterior and superior to the mastoid to develop a tight pocket. Muscle from the temporalis muscle was then harvested for use later in the procedure. Dura hooks where used to hold our view.    A standard mastoidectomy was then completed, carefully identifying the tegmen, sinodural angle and posterior canal wall. We identify the lateral semicircular canal and  short process of the incus. Using 3-audie pacheco, and progressing down to a 2-audie pacheco we opened the facial recess. We carefully identified the chorda tympani, the incus buttress, and the facial nerve. The facial recess was opened exposing the round window and the rest of the middle ear. The mucosa over the round window niche was elevated using the right angle. The round window niche was identified and its lip was drilled to have a 360-degree view of it. An extension of the RW was created to avoid the keyanna fenestra.  The ear was copiously irrigated with saline.  The cochlear implant was then brought onto the operative field and placed in our subperiosteal pocket.   Copious irrigation was then performed  At this point, we performed the opening of the round window with a 27G needle. Using microsuction and the jeweler's forceps, we inserted the cochlear implant electrode without resistance using off-sheath technique. The electrode was advanced slowly passed the marker on the array, and a pull-back technique was used. The insertion was very smooth. The insertion site was then packed with muscle, leaving the electrode lead inferiorly in the facial recess. The electrode was then carefully coiled back into the mastoid and a piece of Gelfoam was placed over the mastoid to protect the electrodes during closure. .    The wound was then closed in 3 layers using a 3-0 Vicryl for the muscle, 4-0 Vicryl for the subcuticular stitches and 5-0 fast gut suture for the dermis. During this time, the audiologist was here to induct neural response telemetry of the cochlear implant and got good responses. eSRT and SmartNav were performed. At the conclusion of this procedure the incision was covered with a sterile dressing and the draping was removed. A Clark dressing was applied to the patient's ear. The patient's care was returned to anesthesia for awakening.    This completed the procedure. The patient was then emerged from  anesthesia and extubated without difficulty. The patient was then transported back to PACU. There were no apparent complications. Following the procedure, the findings were discussed with the patient's family and all of their questions were answered.    Attending Attestation: I was present and scrubbed for the key portions of the procedure.      Implants:   Implant Name Type Inv. Item Serial No.  Lot No. LRB No. Used Action   COCHLEAR, NUCLEUS , PROFILE PLUS W/SLIM MODIOLAR ELECTRODE - [id number] - [id number] ENT Implant COCHLEAR, NUCLEUS , PROFILE PLUS W/SLIM MODIOLAR ELECTRODE [id number] [employer]  Left 1 Implanted     Specimens:   ID Type Source Tests Collected by Time   1 : LEFT EAR CONTENTS Tissue EAR, MIDDLE EAR CONTENTS LEFT SURGICAL PATHOLOGY EXAM Rubina Martinez MD 6/16/2025 8664     Estimated Blood Loss: Minimal  Complications: none  Condition of the patient: Stable  Disposition: PACU    ____________________________________________________  Rubina Martinez MD    Otology/Neurotology/Lateral Skull-Base Surgery   [facility]  Phone: [phone]  Fax: [phone]

## 2025-06-16 NOTE — ANESTHESIA POSTPROCEDURE EVALUATION
Patient: Flavio Bahena    Procedure Summary       Date: 06/16/25 Room / Location: [facility] OR 05 / [facility] OR    Anesthesia Start: 0726 Anesthesia Stop: 1054    Procedure: COCHLEAR IMPLANT (Left) Diagnosis:       Sensorineural hearing loss (SNHL) of both ears      (Sensorineural hearing loss (SNHL) of both ears [H90.3])    Surgeons: Rubina Martinez MD Responsible Provider: Lor Morales MD    Anesthesia Type: general ASA Status: 3            Anesthesia Type: general    Vitals Value Taken Time   /64 06/16/25 11:45   Temp 36.1 °C (97 °F) 06/16/25 11:45   Pulse 66 06/16/25 11:45   Resp 15 06/16/25 11:45   SpO2 95 % 06/16/25 11:45       Anesthesia Post Evaluation    Patient location during evaluation: PACU  Patient participation: complete - patient participated  Level of consciousness: awake and alert  Pain management: adequate  Airway patency: patent  Cardiovascular status: acceptable  Respiratory status: acceptable  Hydration status: acceptable  Postoperative Nausea and Vomiting: none        There were no known notable events for this encounter.

## 2025-06-17 ENCOUNTER — TELEPHONE (OUTPATIENT)
Dept: OTOLARYNGOLOGY | Facility: CLINIC | Age: 65
End: 2025-06-17
Payer: COMMERCIAL

## 2025-06-17 DIAGNOSIS — G89.18 POSTOPERATIVE PAIN: ICD-10-CM

## 2025-06-17 RX ORDER — OXYCODONE HYDROCHLORIDE 5 MG/1
10 TABLET ORAL EVERY 6 HOURS PRN
Qty: 15 TABLET | Refills: 0 | Status: SHIPPED | OUTPATIENT
Start: 2025-06-17

## 2025-06-17 NOTE — TELEPHONE ENCOUNTER
Called daughter's phone number to discuss post operative course since having surgery yesterday. Left direct line for questions or concerns prior to post operative visit.

## 2025-06-17 NOTE — TELEPHONE ENCOUNTER
Patient's daughter called regarding patient's post operative issues. He did have a left side nose bleed after surgery yesterday that lasted until 2am per patient. He has not had any of these symptoms today. His main complaint is post operative pain. He states that the percocet is minimally helping and hoping to get additional medication for a longer duration. Advised to alternate with ibuprofen as well as hot and cold packs. Dr. Martinez will send 10mg of oxycodone q6 for 15 doses. Daughter will call to give me an update later this week to see if it's helping with his symptoms.

## 2025-06-24 LAB
LABORATORY COMMENT REPORT: NORMAL
PATH REPORT.FINAL DX SPEC: NORMAL
PATH REPORT.GROSS SPEC: NORMAL
PATH REPORT.RELEVANT HX SPEC: NORMAL
PATH REPORT.TOTAL CANCER: NORMAL
RESIDENT REVIEW: NORMAL

## 2025-06-26 ENCOUNTER — TREATMENT (OUTPATIENT)
Dept: SPEECH THERAPY | Facility: CLINIC | Age: 65
End: 2025-06-26
Payer: COMMERCIAL

## 2025-06-26 DIAGNOSIS — H90.3 SENSORINEURAL HEARING LOSS (SNHL) OF BOTH EARS: Primary | ICD-10-CM

## 2025-06-26 DIAGNOSIS — R48.8 OTHER SYMBOLIC DYSFUNCTIONS: ICD-10-CM

## 2025-06-26 PROCEDURE — 92507 TX SP LANG VOICE COMM INDIV: CPT | Mod: GN

## 2025-06-26 ASSESSMENT — PAIN - FUNCTIONAL ASSESSMENT: PAIN_FUNCTIONAL_ASSESSMENT: 0-10

## 2025-06-26 ASSESSMENT — PAIN SCALES - GENERAL: PAINLEVEL_OUTOF10: 0 - NO PAIN

## 2025-06-26 NOTE — PROGRESS NOTES
Speech-Language Pathology    SLP Adult Outpatient Speech-Language Pathology Treatment     Patient Name: Flavio Bahena  MRN: [id number]  Today's Date: 6/26/2025     Time Calculation  Start Time: 1301  Stop Time: 1355  Time Calculation (min): 54 min      Current Problem:   1. Sensorineural hearing loss (SNHL) of both ears        2. Other symbolic dysfunctions             Plan:  SLP TX Plan: Continue Plan of Care  SLP Frequency: Every other week  Duration: 6 months  Discussed POC: Patient  Discussed Risks/Benefits: Yes  Patient/Caregiver Agreeable: Yes      Subjective   Pt reported continued home practice with the [employer] [place] and listening to podcasts.     General Visit Information:  Arrival: Independent  Number of Authorized Treatments : Unlimited based on medical necessity  Total Number of Visits : 2  Supervising clinician was present and made all clinical decisions.     Pain Assessment:  Pain Assessment  Pain Assessment: 0-10  0-10 (Numeric) Pain Score: 0 - No pain      Objective   Long Term Goal: Comprehend sentences in competing noise with 80% accuracy in 6-12 months  Establish Date: 06/12/25     Short Term Goals:  Goal: Demonstrate assistive technology and home program therapy applications as evidence by personal report in 3 months.  Establish Date: 06/12/25  Status: Progressing  Progress: Provided education re: Cochlear [employer]- [place]     Goal: Identify consonants varying in place features in words with no visual cues with 80% accuracy in 3 months.   Establish Date: 06/12/25  Status: Progressing  Progress: 63% accuracy (d/b, l/r)     Goal: Identify consonants varying in voicing features in words with no visual cues with 80% accuracy in 3 months.  Establish Date: 06/12/25  Status: Progressing  Progress: 90% accuracy (ch/j, k/g, f/v)     Goal: Comprehend sentences with no visual cues in a closed set of 6-8 with 80% accuracy in 3 months.  Establish Date: 06/12/25  Status: Initiated  Progress: Not targeted      Goal: Comprehend sentences about a picture with no visual cues with 80% accuracy in 6 months.  Establish Date: 06/12/25  Status: Progressing  Progress: 60% accuracy; increased to 90% with repetition     Goal: Comprehend 1-2 paragraphs in quiet with no visual cues by recalling the story or answering WH questions with 80% accuracy in 6 months.   Establish Date: 06/12/25  Status: Initiated  Progress: Not targeted     Goal: Comprehend words in a known category from a closed set of 6-8 in noise of increasing complexity with no visual cues with 80% accuracy in 3 months.   Establish Date: 06/12/25  Status: Progressing  Progress: Not targeted      Outpatient Education:  Adult Outpatient Education  Individual(s) Educated: Patient  Written Education : Cochlear [employer] [place]  Risk and Benefits Discussed with Patient/Caregiver/Other: yes  Patient/Caregiver Demonstrated Understanding: yes  Plan of Care Discussed and Agreed Upon: yes  Patient Response to Education: Patient/Caregiver Verbalized Understanding of Information, Patient/Caregiver Asked Appropriate Questions
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
## 2025-07-01 ENCOUNTER — APPOINTMENT (OUTPATIENT)
Dept: OTOLARYNGOLOGY | Facility: CLINIC | Age: 65
End: 2025-07-01
Payer: COMMERCIAL

## 2025-07-01 DIAGNOSIS — H90.3 SENSORINEURAL HEARING LOSS (SNHL) OF BOTH EARS: ICD-10-CM

## 2025-07-01 DIAGNOSIS — Z96.21 COCHLEAR IMPLANT IN PLACE: ICD-10-CM

## 2025-07-01 DIAGNOSIS — Z98.890 POSTOPERATIVE STATE: Primary | ICD-10-CM

## 2025-07-01 PROCEDURE — 99024 POSTOP FOLLOW-UP VISIT: CPT | Performed by: OTOLARYNGOLOGY

## 2025-07-01 ASSESSMENT — PATIENT HEALTH QUESTIONNAIRE - PHQ9
2. FEELING DOWN, DEPRESSED OR HOPELESS: NOT AT ALL
1. LITTLE INTEREST OR PLEASURE IN DOING THINGS: NOT AT ALL
SUM OF ALL RESPONSES TO PHQ9 QUESTIONS 1 AND 2: 0

## 2025-07-07 ENCOUNTER — CLINICAL SUPPORT (OUTPATIENT)
Dept: AUDIOLOGY | Facility: CLINIC | Age: 65
End: 2025-07-07
Payer: COMMERCIAL

## 2025-07-07 DIAGNOSIS — H90.3 ASYMMETRICAL SENSORINEURAL HEARING LOSS: ICD-10-CM

## 2025-07-07 DIAGNOSIS — H90.3 SENSORINEURAL HEARING LOSS (SNHL) OF BOTH EARS: ICD-10-CM

## 2025-07-07 PROCEDURE — 92567 TYMPANOMETRY: CPT | Mod: 59 | Performed by: SOCIAL WORKER

## 2025-07-07 PROCEDURE — 92553 AUDIOMETRY AIR & BONE: CPT | Mod: 59,LT | Performed by: SOCIAL WORKER

## 2025-07-07 PROCEDURE — 92603 COCHLEAR IMPLT F/UP EXAM 7/>: CPT | Performed by: SOCIAL WORKER

## 2025-07-07 NOTE — PROGRESS NOTES
Name: Flavio Bahena  YOB: 1960  Age: 64 y.o.    Date of Evaluation:  07/07/2025    Flavio Bahena, 64 y.o. , was seen for the initial activation of the Left  cochlear implant to be used in conjunction with the [id number] speech processor. Flavio was implanted by Dr. Martinez on 6/16/25. Post-operative course remains unremarkable.    Recall: Flavio has a history of bilateral asymmetric sensorineural hearing loss with poor word understanding. He is now a bilateral sequential cochlear implant recipient  Prior to programming, the Left ear was tested to determine if he had any residual hearing.    UNAIDED audiogram:  Otoscopic revealed minimal debris in the left ear.  Immittance testing yielded a Type B flat tympanogram with normal ear canal volume in the left ear  Pure tone air and bone conduction testing revealed a profound mixed hearing loss with no response to air conduction at the limits of the audiometer    Programming:  Impedances were evaluated using the N8 speech processor for intra-cochlear electrodes 1-22 in common ground (CG), Monopolar 1 (MP1), Monopolar 2 (MP2) and MP1+2 stimulation modes.  Satisfactory impedances were found for all electrodes, in each stimulation mode.      Magnet strength: 3(I)    The Left N8 processor and remote control were dispensed with all associated equipment and accessories.  The processor was programmed in an MP1+2 stimulation mode using an ACE speech processing strategy, 8 maxima and a 900 Hz stimulation rate.  Electrodes 1-22 were activated.  Using a population mean technique, a psychophysical map was created with good reliability.  C-levels were swept in bands of 3 for comfortable loudness. No facial nerve stimulation was observed.  Progressive maps were created and programmed with increments of 5 clinical units.    Flavio  was oriented to the speech processor, remote assistant and accessories.  He was also given written and recorded instructional  materials.  All equipment was registered electronically.  The processor was paired and connected to the Nucleus Smart Jani.  Use of the Jani with bilateral processors was reviewed today. Wireless TV streamer will be paired and reviewed at a future visit.  Accessories: Aqua + and mini microphone    Overall, Flavio reports hearing voices and words and was able to repeat spondees without visual cues. He is scheduled for a second stimulation on 8/4/25.      Patient Questions:  Did the patient meet with the tidys  prior to surgery?  o  No  o   If the answer is no:  If no, why?  ·          Bilateral sequential    Treatment Plan:  1. Utilize the Nucleus  Cochlear Implant System and the N8 speech processor daily moving through environmental programs P1-P4 as needed in conjunction with the ReSound hearing aid on the contralateral ear.   2. Return for remapping and optimization of the speech processor as scheduled in approximately one  month in order to optimize the psychophysical ACE map.  3. Utilize aural rehabilitation/auditory training techniques at home to improve understanding ability. Recommend re-establish care with auditory verbal therapy resources through [facility]   4. ENT follow up annually    Time:     Completed by:  Zaire Gonzalez CCC-A  Licensed Audiologist

## 2025-07-10 ENCOUNTER — TREATMENT (OUTPATIENT)
Dept: SPEECH THERAPY | Facility: CLINIC | Age: 65
End: 2025-07-10
Payer: COMMERCIAL

## 2025-07-10 DIAGNOSIS — H90.3 SENSORINEURAL HEARING LOSS (SNHL) OF BOTH EARS: Primary | ICD-10-CM

## 2025-07-10 DIAGNOSIS — R48.8 OTHER SYMBOLIC DYSFUNCTIONS: ICD-10-CM

## 2025-07-10 PROCEDURE — 92507 TX SP LANG VOICE COMM INDIV: CPT | Mod: GN

## 2025-07-10 ASSESSMENT — PAIN SCALES - GENERAL: PAINLEVEL_OUTOF10: 0 - NO PAIN

## 2025-07-10 ASSESSMENT — PAIN - FUNCTIONAL ASSESSMENT: PAIN_FUNCTIONAL_ASSESSMENT: 0-10

## 2025-07-10 NOTE — PROGRESS NOTES
Speech-Language Pathology  Speech-Language Pathology    SLP Adult Outpatient Speech-Language Pathology Treatment     Patient Name: Flavio Bahena  MRN: [id number]  Today's Date: 7/10/2025     Time Calculation  Start Time: 1345  Stop Time: 1430  Time Calculation (min): 45 min  Current Problem:   1. Sensorineural hearing loss (SNHL) of both ears        2. Other symbolic dysfunctions             Plan:         Subjective   Pt had appointment Monday 7/7 for initial activation of left cochlear implant. Pt reported that he has been practicing at home with both ears since activation. He reports that the left implant is still quieter than his right implant, yet he has noticed that they're starting to work together.     General Visit Information:     Supervising clinician was present and made all clinical decisions.     Pain Assessment:         Objective   Long Term Goal: Comprehend sentences in competing noise with 80% accuracy in 6-12 months  Establish Date: 06/12/25     Short Term Goals:  Goal: Demonstrate assistive technology and home program therapy applications as evidence by personal report in 3 months.  Establish Date: 06/12/25  Status: Progressing  Progress: Pt reported continued practice at home with listening to podcasts and [employer] with both ear. Pt reported understanding 75-80% of what is said. Pt encouraged to continue practicing listening activities at home with right ear in isolation, left ear in isolation, and both ears together.     Goal: Identify consonants varying in place features in words with no visual cues with 80% accuracy in 3 months.   Establish Date: 06/12/25  Status: Progressing  Progress: Not targeted     Goal: Identify consonants varying in voicing features in words with no visual cues with 80% accuracy in 3 months.  Establish Date: 06/12/25  Status: Progressing  Progress: Not targeted     Goal: Comprehend sentences with no visual cues in a closed set of 6-8 with 80% accuracy in 3  months.  Establish Date: 06/12/25  Status: Initiated  Progress: Not targeted     Goal: Comprehend sentences about a picture with no visual cues with 80% accuracy in 6 months.  Establish Date: 06/12/25  Status: Progressing  Progress: Not targeted     Goal: Comprehend 1-2 paragraphs in quiet with no visual cues by recalling the story or answering WH questions with 80% accuracy in 6 months.   Establish Date: 06/12/25  Status: Initiated  Progress: Not targeted     Goal: Comprehend words in a known category from a closed set of 6-8 in noise of increasing complexity with no visual cues with 80% accuracy in 3 months.   Establish Date: 06/12/25  Status: Progressing  Progress: Not targeted within closed set or in the presence of background noise.   R CI: 40% accuracy; increased to 50% with repetition; increased 80% with known linguistic complex.  L CI: 40% accuracy; increased to 70% with repetition.      Outpatient Education:  Adult Outpatient Education  Individual(s) Educated: Patient  Risk and Benefits Discussed with Patient/Caregiver/Other: yes  Patient/Caregiver Demonstrated Understanding: yes  Plan of Care Discussed and Agreed Upon: yes  Patient Response to Education: Patient/Caregiver Verbalized Understanding of Information, Patient/Caregiver Asked Appropriate Questions

## 2025-07-24 ENCOUNTER — CLINICAL SUPPORT (OUTPATIENT)
Dept: AUDIOLOGY | Facility: CLINIC | Age: 65
End: 2025-07-24
Payer: COMMERCIAL

## 2025-07-24 DIAGNOSIS — H90.3 SENSORINEURAL HEARING LOSS (SNHL) OF BOTH EARS: ICD-10-CM

## 2025-07-24 PROCEDURE — 92626 EVAL AUD FUNCJ 1ST HOUR: CPT | Mod: 59 | Performed by: SOCIAL WORKER

## 2025-07-24 PROCEDURE — 92604 REPROGRAM COCHLEAR IMPLT 7/>: CPT | Performed by: SOCIAL WORKER

## 2025-07-24 NOTE — PROGRESS NOTES
History:  Flavio was seen on 7/24/25 for the 3rd stimulation and program optimization of his  Nucleus  cochlear implants used in conjunction with his  N8 processor  He presents in Program 2 in the right ear and Program 3 on the left side  He denies any recent equipment issues, however he has been experiencing issues with the Nucleus Smart Jani and gaining access to the Cochlear Portal    Right implant surgery date: 4/21/25  Left implant surgery date: 6/16/25    Right activation: 5/8/25  Left activation: 7/7/25    Start/stop times: 840-1000    Programming:  Electrode impedances were evaluated for all intra cochlear electrodes and found to be within normal limits  No short or open electrodes were detected  Electrode compliance levels were assessed and found to be satisfactory  Datalogging reveals an average of 10.1 hours for his right side and 8.8 hours of daily use for his left side  Headset/Coil magnet strength: 4(I) right, 3(I) left    Comfort levels were set to loud but comfortable across the array using a standard loudness ranking technique  Comfort Levels were swept   No facial nerve stimulation was observed or reported  No changes were made to the right maps    Right Map details: Map 6    Left Map details: Map 12    Aided testing of the right side only, was performed in the soundfield while the patient faced the speaker. Aided responses to warble tones were obtained at 30 to 45 dBHL for 250-6000 Hz.  Aided word recognition testing was performed using recorded materials at 50 dBHL  Aided CNC words were 40 % correct  Aided [employer] sentences with at +10 SNR were 9 % correct  Aided [employer] sentences in quiet were 32 % correct  The CIQoL 35 was administered today  Mr. Bahena obtained a global raw score of 29 and a converted raw score of 44.98    Summary:  The right N8 processor was updated with new psychophysical maps today.  Aided testing demonstrates excellent aided detection and fair aided speech understanding  ability in quiet    Treatment Plan:  Consistent use of the bilateral cochlears implant using the most comfortable program  Return 8/4/25 for program optimization and 2nd stimulation of the Left implant  Follow up with otology for medical management of cochlear implant  Pursue/continue speech services for aural rehabilitation/auditory training

## 2025-08-04 ENCOUNTER — CLINICAL SUPPORT (OUTPATIENT)
Dept: AUDIOLOGY | Facility: CLINIC | Age: 65
End: 2025-08-04
Payer: COMMERCIAL

## 2025-08-04 DIAGNOSIS — H90.3 ASYMMETRICAL SENSORINEURAL HEARING LOSS: ICD-10-CM

## 2025-08-04 PROCEDURE — 92567 TYMPANOMETRY: CPT | Mod: 59 | Performed by: SOCIAL WORKER

## 2025-08-04 PROCEDURE — 92553 AUDIOMETRY AIR & BONE: CPT | Mod: 59,LT | Performed by: SOCIAL WORKER

## 2025-08-04 PROCEDURE — 92604 REPROGRAM COCHLEAR IMPLT 7/>: CPT | Performed by: SOCIAL WORKER

## 2025-08-04 NOTE — PROGRESS NOTES
History:  Flavio Bahena was seen on 8/4/25 for the 2nd stimulation and program optimization of his  Left Nucleus  cochlear implant used in conjunction with his  [id number] processor  He presents in the bilateral cochlear implant mode and states that he is hearing from both sides, however the Right side is louder.  He reports listening with the Left side solo for approximately 2 hours per day  He presents in Program 2 for the Left ear and Program 1 for the Right ear  He denies any recent equipment issues     Right implant surgery date: 4/21/25  Left implant surgery date: 6/16/25     Right activation: 5/8/25  Left activation: 7/7/25    Start/stop times: 840-855 audiometric testing  855-955 cochlear implant mapping    Audiometric Testing:  Prior to programming, unaided testing was performed to determine if there is any residual hearing in his left ear.  Otoscopic exam revealed clear canals bilaterally.  Immittance testing yielded type A normal tympanograms with normal ear canal volumes bilaterally.  Pure tone air conduction testing was performed in the left ear using TDH headphones  Air conduction responses were obtained at  dBHL for 125-500 Hz and no response at the limits of the audiometer for 4965-6375 Hz  Pure tone bone conduction responses were obtained at 60-90 dBHL for 500-2000 Hz with no response at the limits of the audiometer for 4000 Hz.  Today's results demonstrate an improvement compared to 7/7/25.    Programming:  Electrode impedances were evaluated for all intra cochlear electrodes and found to be within normal limits  No short or open electrodes were detected  Electrode compliance levels were assessed and found to be satisfactory  Datalogging reveals an average of 7.4 hours of daily use  Headset/Coil magnet strength: 3(I)  Comfort levels were set to loud but comfortable across the array using a standard loudness ranking technique  Threshold levels were set using a standard approach  Comfort  Levels were swept   No facial nerve stimulation was observed or reported    Right Map details: Maps 17, 18 19    Left Map details: Map 14, 15, 16    Progressive maps were provided for each ear, however Mr. Bahena has achieved a healthy dynamic range      Summary:  The bilateral processors were updated with new psychophysical maps today.  Unaided testing of the left ear demonstrates improvement compared to initial activation results    Treatment Plan:  Consistent use of the bilateral cochlear implants, using the most comfortable program  Return in 2 months for program optimization and aided testing of the Left ear  Follow up with otology for medical management of cochlear implant  Pursue/continue speech services for aural rehabilitation/auditory training

## 2025-08-10 ENCOUNTER — APPOINTMENT (OUTPATIENT)
Dept: RADIOLOGY | Facility: HOSPITAL | Age: 65
End: 2025-08-10
Payer: COMMERCIAL

## 2025-08-10 ENCOUNTER — HOSPITAL ENCOUNTER (EMERGENCY)
Facility: HOSPITAL | Age: 65
Discharge: HOME | End: 2025-08-10
Attending: STUDENT IN AN ORGANIZED HEALTH CARE EDUCATION/TRAINING PROGRAM
Payer: COMMERCIAL

## 2025-08-10 VITALS
TEMPERATURE: 98.6 F | HEIGHT: 70 IN | WEIGHT: 161.16 LBS | RESPIRATION RATE: 18 BRPM | HEART RATE: 65 BPM | DIASTOLIC BLOOD PRESSURE: 69 MMHG | SYSTOLIC BLOOD PRESSURE: 97 MMHG | BODY MASS INDEX: 23.07 KG/M2 | OXYGEN SATURATION: 94 %

## 2025-08-10 DIAGNOSIS — S61.211A LACERATION OF LEFT INDEX FINGER WITHOUT FOREIGN BODY WITHOUT DAMAGE TO NAIL, INITIAL ENCOUNTER: ICD-10-CM

## 2025-08-10 DIAGNOSIS — S61.112A LACERATION OF LEFT THUMB WITHOUT FOREIGN BODY WITH DAMAGE TO NAIL, INITIAL ENCOUNTER: ICD-10-CM

## 2025-08-10 DIAGNOSIS — S62.522B OPEN DISPLACED FRACTURE OF DISTAL PHALANX OF LEFT THUMB, INITIAL ENCOUNTER: Primary | ICD-10-CM

## 2025-08-10 PROCEDURE — 73130 X-RAY EXAM OF HAND: CPT | Mod: LEFT SIDE | Performed by: STUDENT IN AN ORGANIZED HEALTH CARE EDUCATION/TRAINING PROGRAM

## 2025-08-10 PROCEDURE — 90715 TDAP VACCINE 7 YRS/> IM: CPT

## 2025-08-10 PROCEDURE — 99283 EMERGENCY DEPT VISIT LOW MDM: CPT | Performed by: STUDENT IN AN ORGANIZED HEALTH CARE EDUCATION/TRAINING PROGRAM

## 2025-08-10 PROCEDURE — 2500000004 HC RX 250 GENERAL PHARMACY W/ HCPCS (ALT 636 FOR OP/ED)

## 2025-08-10 PROCEDURE — 2500000001 HC RX 250 WO HCPCS SELF ADMINISTERED DRUGS (ALT 637 FOR MEDICARE OP): Performed by: STUDENT IN AN ORGANIZED HEALTH CARE EDUCATION/TRAINING PROGRAM

## 2025-08-10 PROCEDURE — 73130 X-RAY EXAM OF HAND: CPT | Mod: LT

## 2025-08-10 PROCEDURE — 2500000001 HC RX 250 WO HCPCS SELF ADMINISTERED DRUGS (ALT 637 FOR MEDICARE OP)

## 2025-08-10 PROCEDURE — 13132 CMPLX RPR F/C/C/M/N/AX/G/H/F: CPT

## 2025-08-10 PROCEDURE — 12001 RPR S/N/AX/GEN/TRNK 2.5CM/<: CPT

## 2025-08-10 PROCEDURE — 96365 THER/PROPH/DIAG IV INF INIT: CPT | Mod: 59

## 2025-08-10 PROCEDURE — 90471 IMMUNIZATION ADMIN: CPT

## 2025-08-10 PROCEDURE — 99284 EMERGENCY DEPT VISIT MOD MDM: CPT | Mod: 25

## 2025-08-10 PROCEDURE — 2500000004 HC RX 250 GENERAL PHARMACY W/ HCPCS (ALT 636 FOR OP/ED): Performed by: STUDENT IN AN ORGANIZED HEALTH CARE EDUCATION/TRAINING PROGRAM

## 2025-08-10 RX ORDER — CEPHALEXIN 500 MG/1
500 CAPSULE ORAL 4 TIMES DAILY
Qty: 28 CAPSULE | Refills: 0 | Status: SHIPPED | OUTPATIENT
Start: 2025-08-10 | End: 2025-08-17

## 2025-08-10 RX ORDER — CEPHALEXIN 500 MG/1
500 CAPSULE ORAL ONCE
Status: COMPLETED | OUTPATIENT
Start: 2025-08-10 | End: 2025-08-10

## 2025-08-10 RX ORDER — LIDOCAINE HYDROCHLORIDE 10 MG/ML
10 INJECTION, SOLUTION INFILTRATION; PERINEURAL ONCE
Status: COMPLETED | OUTPATIENT
Start: 2025-08-10 | End: 2025-08-10

## 2025-08-10 RX ORDER — OXYCODONE AND ACETAMINOPHEN 5; 325 MG/1; MG/1
1 TABLET ORAL ONCE
Refills: 0 | Status: COMPLETED | OUTPATIENT
Start: 2025-08-10 | End: 2025-08-10

## 2025-08-10 RX ORDER — CEFAZOLIN SODIUM 2 G/100ML
2 INJECTION, SOLUTION INTRAVENOUS ONCE
Status: COMPLETED | OUTPATIENT
Start: 2025-08-10 | End: 2025-08-10

## 2025-08-10 RX ORDER — OXYCODONE AND ACETAMINOPHEN 5; 325 MG/1; MG/1
1 TABLET ORAL EVERY 6 HOURS PRN
Qty: 10 TABLET | Refills: 0 | Status: SHIPPED | OUTPATIENT
Start: 2025-08-10 | End: 2025-08-13

## 2025-08-10 RX ADMIN — CEFAZOLIN SODIUM 2 G: 2 INJECTION, SOLUTION INTRAVENOUS at 12:58

## 2025-08-10 RX ADMIN — LIDOCAINE HYDROCHLORIDE 10 ML: 10 INJECTION, SOLUTION INFILTRATION; PERINEURAL at 14:02

## 2025-08-10 RX ADMIN — CEPHALEXIN 500 MG: 500 CAPSULE ORAL at 12:45

## 2025-08-10 RX ADMIN — OXYCODONE HYDROCHLORIDE AND ACETAMINOPHEN 1 TABLET: 5; 325 TABLET ORAL at 12:38

## 2025-08-10 RX ADMIN — TETANUS TOXOID, REDUCED DIPHTHERIA TOXOID AND ACELLULAR PERTUSSIS VACCINE, ADSORBED 0.5 ML: 5; 2.5; 8; 8; 2.5 SUSPENSION INTRAMUSCULAR at 12:39

## 2025-08-10 ASSESSMENT — PAIN SCALES - GENERAL: PAINLEVEL_OUTOF10: 8

## 2025-08-10 ASSESSMENT — PAIN DESCRIPTION - PROGRESSION: CLINICAL_PROGRESSION: NOT CHANGED

## 2025-08-10 ASSESSMENT — PAIN DESCRIPTION - LOCATION: LOCATION: FINGER (COMMENT WHICH ONE)

## 2025-08-10 ASSESSMENT — PAIN DESCRIPTION - ONSET: ONSET: SUDDEN

## 2025-08-10 ASSESSMENT — PAIN DESCRIPTION - DESCRIPTORS: DESCRIPTORS: THROBBING

## 2025-08-10 ASSESSMENT — PAIN DESCRIPTION - FREQUENCY: FREQUENCY: CONSTANT/CONTINUOUS

## 2025-08-10 ASSESSMENT — PAIN - FUNCTIONAL ASSESSMENT: PAIN_FUNCTIONAL_ASSESSMENT: 0-10

## 2025-08-10 ASSESSMENT — PAIN DESCRIPTION - ORIENTATION: ORIENTATION: LEFT

## 2025-08-10 ASSESSMENT — PAIN DESCRIPTION - PAIN TYPE: TYPE: ACUTE PAIN

## 2025-08-14 ENCOUNTER — TREATMENT (OUTPATIENT)
Dept: SPEECH THERAPY | Facility: CLINIC | Age: 65
End: 2025-08-14
Payer: COMMERCIAL

## 2025-08-14 DIAGNOSIS — H90.3 SENSORINEURAL HEARING LOSS (SNHL) OF BOTH EARS: Primary | ICD-10-CM

## 2025-08-14 DIAGNOSIS — R48.8 OTHER SYMBOLIC DYSFUNCTIONS: ICD-10-CM

## 2025-08-14 PROCEDURE — 92507 TX SP LANG VOICE COMM INDIV: CPT | Mod: GN

## 2025-08-14 ASSESSMENT — PAIN - FUNCTIONAL ASSESSMENT: PAIN_FUNCTIONAL_ASSESSMENT: 0-10

## 2025-08-14 ASSESSMENT — PAIN SCALES - GENERAL: PAINLEVEL_OUTOF10: 0 - NO PAIN

## 2025-08-29 DIAGNOSIS — Z12.2 SCREENING FOR LUNG CANCER: ICD-10-CM

## 2025-08-29 DIAGNOSIS — Z72.0 NICOTINE ABUSE: Primary | ICD-10-CM

## 2025-09-02 ENCOUNTER — TELEPHONE (OUTPATIENT)
Dept: RADIOLOGY | Facility: HOSPITAL | Age: 65
End: 2025-09-02
Payer: COMMERCIAL

## 2025-10-07 ENCOUNTER — APPOINTMENT (OUTPATIENT)
Dept: OTOLARYNGOLOGY | Facility: CLINIC | Age: 65
End: 2025-10-07
Payer: COMMERCIAL

## 2026-04-30 ENCOUNTER — APPOINTMENT (OUTPATIENT)
Dept: AUDIOLOGY | Facility: CLINIC | Age: 66
End: 2026-04-30
Payer: COMMERCIAL

## (undated) DEVICE — NEEDLE, HYPODERMIC, MONOJECT, 27 G X 1.5 IN

## (undated) DEVICE — COMB, HAIR, 7 IN, PLASTIC, BLACK

## (undated) DEVICE — PAD, GROUNDING, ELECTROSURGICAL, W/15 FT CABLE, POLYHESIVE II, ADULT, LF

## (undated) DEVICE — REST, HEAD, BAGEL, 9 IN

## (undated) DEVICE — Device

## (undated) DEVICE — DRESSING, EAR, GLASSCOCK, ADULT

## (undated) DEVICE — BALL, DIAMOND, 4 MM

## (undated) DEVICE — NEEDLE, HYPODERMIC, 25 G X 1.5 IN, A BEVEL, STERILE

## (undated) DEVICE — SUTURE, MONOCRYL, 4-0, 18 IN, PS2, UNDYED

## (undated) DEVICE — BAND, RUBBER, 3 IN, STERILE

## (undated) DEVICE — COVER, CART, 45 X 27 X 48 IN, CLEAR

## (undated) DEVICE — SYRINGE, MONOJECT, LUER LOCK, 3 CC, LF

## (undated) DEVICE — NEEDLE, HYPODERMIC, MONOJECT, TRI-BEVELED, ANTI-CORING, 27 G X 1.25 IN, LUER LOCK HUB, YELLOW

## (undated) DEVICE — TUBING, SUCTION, OTOMED

## (undated) DEVICE — BUR, FINE DIAMOND, 1.5MM, ROUND EXTENDED

## (undated) DEVICE — BALL, FLUTED, 4 MM

## (undated) DEVICE — TAPE, SILK, DURAPORE, 3 IN X 10 YD, LF

## (undated) DEVICE — PROTECTOR, NERVE, ULNAR, PINK

## (undated) DEVICE — BUR, FINE DIAMOND, 1MM, ROUND EXTENDED

## (undated) DEVICE — BALL, FLUTED, 6 MM

## (undated) DEVICE — ELECTRODE, PAIRED SUBDERMAL OTO

## (undated) DEVICE — NEEDLE, HYPODERMIC, MONOJECT, TRI-BEVELED, ANTI-CORING, 25 G X 1.25 IN, LUER LOCK HUB, RED

## (undated) DEVICE — WAX, BONE, 2.5 GM

## (undated) DEVICE — SYRINGE, 1 CC, LUER LOCK

## (undated) DEVICE — MANIFOLD, 4 PORT NEPTUNE STANDARD

## (undated) DEVICE — STRIP, SKIN CLOSURE, STERI STRIP, REINFORCED, 0.5 X 4 IN

## (undated) DEVICE — DRAPE, TOWEL, STERI DRAPE, 17 X 11 IN, PLASTIC, STERILE

## (undated) DEVICE — SUTURE, VICRYL, 3-0,18 IN, SH, UNDYED

## (undated) DEVICE — CLEANER, WIPE, INSTRUMENT, 3.25 X 3.25 IN

## (undated) DEVICE — CUP, SOLUTION

## (undated) DEVICE — BUR, 1.5MM DIAMOND EXT

## (undated) DEVICE — BUR, CUTTER, 5MM, ROUND

## (undated) DEVICE — DRAPE, SURGICAL, OTOLOGY GLASSCOCK

## (undated) DEVICE — STOCKINETTE, IMPERVIOUS, 12 X 48 IN, LF, STERILE

## (undated) DEVICE — BALL, DIAMOND, 2 MM